# Patient Record
Sex: FEMALE | Race: WHITE | Employment: FULL TIME | URBAN - METROPOLITAN AREA
[De-identification: names, ages, dates, MRNs, and addresses within clinical notes are randomized per-mention and may not be internally consistent; named-entity substitution may affect disease eponyms.]

---

## 2017-05-08 ENCOUNTER — GENERIC CONVERSION - ENCOUNTER (OUTPATIENT)
Dept: OTHER | Facility: OTHER | Age: 32
End: 2017-05-08

## 2017-08-28 ENCOUNTER — GENERIC CONVERSION - ENCOUNTER (OUTPATIENT)
Dept: OTHER | Facility: OTHER | Age: 32
End: 2017-08-28

## 2017-08-28 ENCOUNTER — ALLSCRIPTS OFFICE VISIT (OUTPATIENT)
Dept: OTHER | Facility: OTHER | Age: 32
End: 2017-08-28

## 2017-08-28 DIAGNOSIS — Z34.90 ENCOUNTER FOR SUPERVISION OF NORMAL PREGNANCY: ICD-10-CM

## 2017-09-01 LAB
EXTERNAL ABO GROUPING: NORMAL
EXTERNAL ANTIBODY SCREEN: NORMAL
EXTERNAL HEPATITIS B SURFACE ANTIGEN: NORMAL
EXTERNAL HIV-1 ANTIBODY: NORMAL
EXTERNAL RH FACTOR: POSITIVE
EXTERNAL RUBELLA IGG QUANTITATION: NORMAL
EXTERNAL SYPHILIS RPR SCREEN: NORMAL

## 2017-09-13 ENCOUNTER — GENERIC CONVERSION - ENCOUNTER (OUTPATIENT)
Dept: OTHER | Facility: OTHER | Age: 32
End: 2017-09-13

## 2017-09-21 LAB
EXTERNAL CHLAMYDIA SCREEN: NORMAL
EXTERNAL GONORRHEA SCREEN: NORMAL

## 2017-10-02 ENCOUNTER — GENERIC CONVERSION - ENCOUNTER (OUTPATIENT)
Dept: OTHER | Facility: OTHER | Age: 32
End: 2017-10-02

## 2017-10-02 ENCOUNTER — ALLSCRIPTS OFFICE VISIT (OUTPATIENT)
Dept: PERINATAL CARE | Facility: CLINIC | Age: 32
End: 2017-10-02
Payer: COMMERCIAL

## 2017-10-02 PROCEDURE — 76813 OB US NUCHAL MEAS 1 GEST: CPT | Performed by: OBSTETRICS & GYNECOLOGY

## 2017-10-06 LAB — EXTERNAL AFP: NORMAL

## 2017-10-10 ENCOUNTER — LAB CONVERSION - ENCOUNTER (OUTPATIENT)
Dept: OTHER | Facility: OTHER | Age: 32
End: 2017-10-10

## 2017-10-10 LAB
ABNORMAL MSS? (HISTORICAL): NO
ABNORMAL US? (HISTORICAL): NO
ADVANCED MATERNAL AGE? (HISTORICAL): YES
CHROMOSOME ANALYSIS (HISTORICAL): NORMAL
CHROMOSOME, BLOOD (HISTORICAL): NOT DETECTED
CLINICAL HISTORY (HISTORICAL): NO
COMMENTS: (HISTORICAL): NORMAL
FETAL FRACTION (HISTORICAL): NORMAL
GESTATIONAL AGE (HISTORICAL): 13
GESTATIONAL AGE (HISTORICAL): 5
INTERPRETATION (HISTORICAL): NORMAL
LIMITATIONS (HISTORICAL): NORMAL
METHODOLOGY (HISTORICAL): NORMAL
MICRODELETION (HISTORICAL): NORMAL
MICRODELETION INTERPR. (HISTORICAL): NORMAL
NUMBER OF FETUSES (HISTORICAL): 1
SPECIFICATIONS (HISTORICAL): NORMAL
TRISOMY 13 (T13) (HISTORICAL): NEGATIVE
TRISOMY 18 (T18) (HISTORICAL): NEGATIVE
TRISOMY 21 (T21) (HISTORICAL): NEGATIVE

## 2017-10-11 ENCOUNTER — GENERIC CONVERSION - ENCOUNTER (OUTPATIENT)
Dept: OTHER | Facility: OTHER | Age: 32
End: 2017-10-11

## 2017-10-12 ENCOUNTER — GENERIC CONVERSION - ENCOUNTER (OUTPATIENT)
Dept: OTHER | Facility: OTHER | Age: 32
End: 2017-10-12

## 2017-10-14 ENCOUNTER — GENERIC CONVERSION - ENCOUNTER (OUTPATIENT)
Dept: OBGYN CLINIC | Facility: CLINIC | Age: 32
End: 2017-10-14

## 2017-11-13 ENCOUNTER — GENERIC CONVERSION - ENCOUNTER (OUTPATIENT)
Dept: OTHER | Facility: OTHER | Age: 32
End: 2017-11-13

## 2017-11-28 ENCOUNTER — ALLSCRIPTS OFFICE VISIT (OUTPATIENT)
Dept: PERINATAL CARE | Facility: CLINIC | Age: 32
End: 2017-11-28
Payer: COMMERCIAL

## 2017-11-28 ENCOUNTER — GENERIC CONVERSION - ENCOUNTER (OUTPATIENT)
Dept: OTHER | Facility: OTHER | Age: 32
End: 2017-11-28

## 2017-11-28 PROCEDURE — 76811 OB US DETAILED SNGL FETUS: CPT | Performed by: OBSTETRICS & GYNECOLOGY

## 2017-11-28 PROCEDURE — 76817 TRANSVAGINAL US OBSTETRIC: CPT | Performed by: OBSTETRICS & GYNECOLOGY

## 2017-12-11 ENCOUNTER — GENERIC CONVERSION - ENCOUNTER (OUTPATIENT)
Dept: OTHER | Facility: OTHER | Age: 32
End: 2017-12-11

## 2018-01-09 ENCOUNTER — GENERIC CONVERSION - ENCOUNTER (OUTPATIENT)
Dept: OTHER | Facility: OTHER | Age: 33
End: 2018-01-09

## 2018-01-09 DIAGNOSIS — O09.522 SUPERVISION OF ELDERLY MULTIGRAVIDA IN SECOND TRIMESTER: ICD-10-CM

## 2018-01-09 NOTE — PROGRESS NOTES
SEP 21 2016         RE: Siri Abt                                   To: [facility]   MR#: [id number]                                    [address]   : [facility] [address] [facility]   ENC: [id number]                             Fax: [phone]   (Exam #: [id number])      The LMP of this 33year old,  G2, P1-0-0-1 patient was 2016, giving   her an ADDISON of 2017 and a current gestational age of 11 weeks 3 days   by dates  A sonographic examination was performed on SEP 21 2016 using   real time equipment  The ultrasound examination was performed using   abdominal technique  The patient has a BMI of 37 6  Her blood pressure   today was 133/89  Rossana Gomes had a prenatal office visit earlier today, at which time the fetal   heart rate was not able to be obtained  She presents to the [facility] today to assess fetal viability  Rossana Gomes has no physical complaints  She denies vaginal bleeding or abdominal pain  Multiple longitudinal and transverse sections revealed a ang   intrauterine pregnancy  A slightly indented gestational sac was documented  A normal fetal pole   was visualized  Cardiac motion was not observed  INDICATIONS      viability      Exam Types      Level I      MEASUREMENTS (* Included In Average GA)      CRL              1 7 cm        8 weeks 1 day  *      THE AVERAGE GESTATIONAL AGE is 8 weeks 1 day +/- 5 days  ANATOMY COMMENTS      A single non viable pregnancy is identified  Absence of fetal heart rate   activity is noted on real-time and color Doppler evaluation  The uterine   contour appears normal  There is no suspicion of uterine myoma  Free fluid   is not identified in the posterior cul-de-sac  The ovaries are difficult   to image well  The ultrasound findings are consistent with a missed     ADNEXA      The left ovary was not visualized  The right ovary was not visualized  IMPRESSION      Ramos IUP   8 weeks and 1 day by this ultrasound  (ADDISON=MAY 2 2017)   Heart movement not seen      GENERAL COMMENT      Today's ultrasound findings and suggested follow-up were discussed in   detail with Rossana Gomes  Your office will be in contact with her  to schedule an   appointment in the office to discuss management of the missed ,   including the options of expectant management and a D & E procedure, which   were briefly discussed at the visit today  The face to face time, in addition to time spent discussing ultrasound   results, was 10 minutes, greater than 50% of which was spent during   counseling and coordination of care  ALISA Wise M D     Maternal-Fetal Medicine   Electronically signed 16 17:44

## 2018-01-09 NOTE — PROGRESS NOTES
OCT 2 2017         RE: Nia Marti                                   To: [facility]   MR#: [id number]                                    [address]   : [facility], [address] [place] [address]   ENC: [id number]                             Fax: [phone]   (Exam #: [id number])      The LMP of this 27year old,  G4, P1-0-2-1 patient was 2017, giving   her an ADDISON of 2018 and a current gestational age of 17 weeks 1 day   by dates  A sonographic examination was performed on OCT 2 2017 using real   time equipment  The ultrasound examination was performed using abdominal   technique  The patient has a BMI of 40 3  Her blood pressure today was   117/78  Earliest US on record: 17  7w6d  4-10-18   ADDISON      Multiple longitudinal and transverse sections revealed a ang   intrauterine pregnancy with the fetus in vertex presentation  The placenta   is anterior in implantation, grade I in appearance  Cardiac motion was observed at 159 bpm       INDICATIONS      first trimester genetic screening   morbid obesity   advanced maternal age      Exam Types      Level I      RESULTS      Fetus # 1 of 1   Vertex presentation   Fetal growth appeared normal      MEASUREMENTS (* Included In Average GA)      CRL              6 8 cm        12 weeks 6 days*   Nuchal Trans    1 60 mm      THE AVERAGE GESTATIONAL AGE is 12 weeks 6 days +/- 7 days  ANATOMY COMMENTS      Anatomic detail is limited at this gestational age  The yolk sac was not   noted  The fetal cranium appeared normal in shape and the nuchal   translucency was normal in size (1 6mm)  The nasal bone appears to be   present  The intracranial anatomy was unremarkable  Evaluation of the   spine revealed no obvious evidence for a neural tube defect  Anatomy of   the fetal thorax appeared within normal limits  The cardiac rhythm was   regular    Within the abdomen, stomach & bladder were visualized and the   abdominal wall appeared intact  A three vessel cord appears to be present  Active movement of the fetal body & extremities was seen  There is no   suspicion of a subchorionic bleed  The placental cord insertion was   normal    There is no suspicion of a uterine myoma  Free fluid is not seen   in the posterior cul-de-sac  ADNEXA      The left ovary was not visualized  The right ovary was not visualized  AMNIOTIC FLUID         Largest Vertical Pocket = 3 3 cm   Amniotic Fluid: Normal      IMPRESSION      Ramos IUP   12 weeks and 6 days by this ultrasound  (ADDISON=APR 10 2018)   Vertex presentation   Fetal growth appeared normal   Regular fetal heart rate of 159 bpm   Anterior placenta      CONSULT COMMENT      Thank you very much for requesting a consultation on this very nice   patient for the indication of genetic screening  This is the patient's   fourth pregnancy  She has a history of a previous full-term vaginal   delivery in 2011  She had 2 first trimester miscarriages, her first in   2010 and her second in 2016  She has a history of asthma and morbid   obesity  She denies the current use of tobacco, alcohol, or drugs  She   currently takes prenatal vitamins and utilizes her albuterol inhaler and   Advair as needed  She does not utilize her Advair as a maintenance   medication but only when she developeds a respiratory infection  We discussed the options for genetic screening, including but not limited   to first trimester screening, second trimester screening, combined first   and second trimester screening, noninvasive prenatal testing (NIPT) for   patients at high risk and diagnostic screening through the use of CVS and   amniocentesis    We discussed the risks and benefits of each approach   including the sensitivities and false positive rates as well as the   difference between a screening test and a diagnostic test   At the   conclusion of our discussion the patient elected noninvasive prenatal   testing utilizing the QNatal Advanced cell-free fetal DNA test through   [facility]  The patient was given a requisition to have this blood   work drawn through [facility] and the results should be available   in approximately 7-10 days  The implications of obesity and pregnancy are significant  The level of   obesity is directly related to the risk of adverse pregnancy outcomes   including but not limited to, risk of diabetes, hypertensive disorders of   pregnancy, macrosomia, intrauterine growth restriction, labor and shoulder   dystocias,  section, and increased risk of stillbirth  Recommend   discussing the current weight gain recommendations for women with obesity   and discussing good dietary practices as well as the safety of exercise in   pregnancy  I recommend the patient gain no more than 10-15 pounds   throughout her entire pregnancy, increase her exercise and follow healthy   dietary habits  Consider referral to a dietitian should the patient have   difficulty following the aforementioned recommendations  Recommend third   trimester growth ultrasounds to screen for fetal growth problems as well   as ensuring the patient is appropriately screened for pregestational and   gestational diabetes  We discussed follow-up in detail and I recommend an anatomy ultrasound be   scheduled for 20 weeks gestation  Thank you very much for allowing us to participate in the care of this   very nice patient  Should you have any questions, please do not hesitate   to contact our office  Please note, in addition to the time spent discussing the results of the   ultrasound, I spent approximately 15 minutes of face-to-face time with the   patient, greater than 50% of which was spent in counseling and the   coordination of care for this patient  Portions of the record may have been created with voice recognition   software    Occasional wrong word or "sound a like" substitutions may have   occurred due to the inherent limitations of voice recognition software  Read the chart carefully and recognize, using context, where substitutions   have occurred  ALISA Hendrix M D     Electronically signed 10/05/17 21:51

## 2018-01-10 NOTE — MISCELLANEOUS
Attached is your new obstetrical paperwork package for your upcoming appointment  Please fill out all highlighted sections of Parts 2, 3 and 4 and complete  the questionnaire and consent forms in their entirety  Please contact your insurance company to find out which lab you must use for bloodwork  Also, please bring all completed paperwork with you to your first appointment  Prior to your first  appointment, please fax or email a copy of your current insurance card (both sides) in order for our office to pre-certify your insurance  Our fax number is [phone], shahnaz Gonzalez or email to  [email or web address]  Arrive at least 20 minutes prior to your appointment time

## 2018-01-11 NOTE — MISCELLANEOUS
Message  patient called and requested to know gender and is aware female  Active Problems    1  , spontaneous threatened (640 00) (O20 0)   2  Asthma (493 90) (J45 909)   3  Encounter for pregnancy related examination (V22 1) (Z34 90)   4  Encounter for pregnancy related examination in first trimester (V22 1) (Z34 91)   5  High risk sexual behavior (V69 2) (Z72 51)   6  Knee pain, left (719 46) (M25 562)   7  Missed  (69 849 69 22) (O02 1)   8  Spontaneous  (634 90) (O03 9)   9  Threatened  (640 00) (O20 0)    Current Meds   1  Advair Diskus 100-50 MCG/DOSE Inhalation Aerosol Powder Breath Activated; INHALE   ONE PUFF BY MOUTH TWICE A DAY; Therapy: 61AGW6556 to ([facility])  Requested for: 32YDO7902; Last   WF:33FIH2221 Ordered   2  Advil CAPS; Therapy: (Recorded:2017) to Recorded   3  Montelukast Sodium 10 MG Oral Tablet (Singulair); take one tablet by mouth every day; Therapy: 70WYK5938 to ([facility] )  Requested for: 55GKO7680; Last   JH:33AMY2372 Ordered   4  ProAir  (90 Base) MCG/ACT Inhalation Aerosol Solution; INHALE 1 TO 2 PUFFS   BY MOUTH EVERY 4 TO 6 HOURS AS NEEDED; Therapy: 39Jka0629 to (Evaluate:41Kqs0949)  Requested for: 64ZFF8679; Last   KH:43EKI7569 Ordered   5  Vitafol-OB+DHA 65-1 & 250 MG Oral Miscellaneous; TAKE 1 MG Daily; Therapy: 82ZMO9104 to (Last Rx:68Cxk3102)  Requested for: 25Rfn4527 Ordered    Allergies    1  No Known Drug Allergies    2  Animal dander   3  No Known Latex Allergies   4   Seasonal    Signatures   Electronically signed by : Elizabeth Neely, ; Oct 12 2017  9:49AM EST                       (Author)

## 2018-01-11 NOTE — MISCELLANEOUS
Message  Lm on pt voice mail with Q-Jordan results-wnl for trisomies 13,18,21  AFP testing instruction also given to pt and pt to contact [facility] if questions-trf mailed  Active Problems    1  , spontaneous threatened (640 00) (O20 0)   2  Asthma (493 90) (J45 909)   3  Encounter for pregnancy related examination (V22 1) (Z34 90)   4  Encounter for pregnancy related examination in first trimester (V22 1) (Z34 91)   5  High risk sexual behavior (V69 2) (Z72 51)   6  Knee pain, left (719 46) (M25 562)   7  Missed  (69 849 69 22) (O02 1)   8  Spontaneous  (634 90) (O03 9)   9  Threatened  (640 00) (O20 0)    Current Meds   1  Advair Diskus 100-50 MCG/DOSE Inhalation Aerosol Powder Breath Activated; INHALE   ONE PUFF BY MOUTH TWICE A DAY; Therapy: 95IKB4922 to (Caitlyn Gonzales)  Requested for: 94OGW3145; Last   FH:92OTD2776 Ordered   2  Advil CAPS; Therapy: (Recorded:2017) to Recorded   3  Montelukast Sodium 10 MG Oral Tablet (Singulair); take one tablet by mouth every day; Therapy: 91SJP0519 to (Jyoti Carnes)  Requested for: 41JEX8533; Last   XO:82UUW0229 Ordered   4  ProAir  (90 Base) MCG/ACT Inhalation Aerosol Solution; INHALE 1 TO 2 PUFFS   BY MOUTH EVERY 4 TO 6 HOURS AS NEEDED; Therapy: 67Prq5746 to (Evaluate:39Lht9240)  Requested for: 56KHP4533; Last   DP:03TEK4820 Ordered   5  Vitafol-OB+DHA 65-1 & 250 MG Oral Miscellaneous; TAKE 1 MG Daily; Therapy: 14VJG2778 to (Last Rx:96Hqb3773)  Requested for: 44Ecw9582 Ordered    Allergies    1  No Known Drug Allergies    2  Animal dander   3  No Known Latex Allergies   4   Seasonal    Signatures   Electronically signed by : Ralph Bell RN; Oct 11 2017  2:38PM EST                       (Author)

## 2018-01-11 NOTE — CONSULTS
I had the pleasure of evaluating your patient, Marbella Stallings  My full evaluation follows:      Chief Complaint  Here for ultrasound study      History of Present Illness  Please refer to the ultrasound report for additional information  Active Problems    1  Asthma (493 90) (J45 909)   2  Encounter for supervision of normal pregnancy in multigravida in second trimester   (V22 1) (Z34 82)   3  History of High risk sexual behavior (V69 2) (Z72 51)    Past Medical History    · History of Asthma with exacerbation (493 92) (J45 901)   · History of Encounter for gynecological examination with Papanicolaou smear of cervix  (V72 31) (Z01 419)   · History of Encounter for pregnancy related examination (V22 1) (Z34 90)   · History of Encounter for pregnancy related examination in first trimester (V22 1) (Z34 91)   · History of nausea (V12 79) (K77 867)   · History of pregnancy (V13 29)   · History of seasonal allergies (V15 09) (Z88 9)   · History of spontaneous  (V13 29) (Z87 59)   · History of threatened  (V13 29) (Z87 59)   · History of threatened  (V13 29) (Z87 59)   · History of vomiting (V13 89) (Z87 898)   · History of Knee pain, left (719 46) (M25 562)   · History of Missed  (632) (O02 1)    Surgical History    · Denied: History Of Prior Surgery    Family History    · No pertinent family history    · Family history of diabetes mellitus (V18 0) (Z83 3)   · Family history of hypothyroidism (V18 19) (Z83 49)    · Family history of asthma (V17 5) (Z82 5)    Social History    · Denied: History of H/O domestic violence   · History of High risk sexual behavior (V69 2) (Z72 51)   · Never a smoker   · No alcohol use   · No drug use    Current Meds   1  Advair Diskus 100-50 MCG/DOSE Inhalation Aerosol Powder Breath Activated; INHALE   ONE PUFF BY MOUTH TWICE A DAY; Therapy: 87KQU7248 to (Karissa Canseco)  Requested for: 48QRH9349; Last   ES:53KWW8431 Ordered   2   Montelukast Sodium 10 MG Oral Tablet; take one tablet by mouth every day; Therapy: 88DWF8972 to (Lacinda Shows)  Requested for: 21THV5126; Last   GW:20HWL2456 Ordered   3  ProAir  (90 Base) MCG/ACT Inhalation Aerosol Solution; INHALE 1 TO 2 PUFFS   BY MOUTH EVERY 4 TO 6 HOURS AS NEEDED; Therapy: 76Dtm6199 to (Evaluate:05Akk0451)  Requested for: 93PEE3805; Last   UT:70SPX2651 Ordered   4  Vitafol-OB+DHA 65-1 & 250 MG Oral Miscellaneous; Take one by mouth every day; Therapy: 68GMK6446 to (Evaluate:12Ejk8492)  Requested for: 95WIH1919; Last   Rx:17Skj4223 Ordered    Allergies    1  No Known Drug Allergies    2  Animal dander   3  No Known Latex Allergies   4  Seasonal    Vitals   Recorded: 24ZZJ6520 68:87AD   Systolic 072, LUE, Sitting   Diastolic 70, LUE, Sitting   BP CUFF SIZE Large   Height 5 ft 4 in   Weight 241 lb    BMI Calculated 41 37   BSA Calculated 2 12   Pain Scale 0     Results/Data  Exam description: level II obstetrical ultrasound, transvaginal obstetrical ultrasound  Findings: Please refer to the ultrasound report for additional information  Discussion/Summary    Please refer to the ultrasound report for additional information  The patient was counseled regarding diagnostic results, instructions for management, prognosis, impressions  Thank you very much for allowing me to participate in the care of this patient  If you have any questions, please do not hesitate to contact me        Future Appointments    Signatures   Electronically signed by : BARRON Paniagua ; Nov 30 2017  7:51AM EST                       (Author)

## 2018-01-12 NOTE — RESULT NOTES
Verified Results  (Q) QNATAL (TM) ADVANCED 46OTB0597 07:35AM Mimi Andrade     Test Name Result Flag Reference   NUMBER OF FETUSES 1     ADVANCED MATERNAL AGE? YES     ABNORMAL ADDISON? NO     ABNORMAL US? NO     PERSONAL/FAM HISTORY? NO     INTERPRETATION SEE NOTE     This specimen showed expected representation of chromosome  21, 18, and 13 material  Microdeletion testing was not  performed per clinician request    TRISOMY 21 (T21) Negative     TRISOMY 18 (T18) Negative     TRISOMY 13 (T13) Negative     Y CHROMOSOME Not detected     Y CHR  INTERPRETATION SEE NOTE     Consistent with a female fetus  SEX CHROMOSOME No aneuploidy     SEX CHROMOSOME INTERP SEE NOTE     No apparent abnormality was detected  See 'Limitations'  below  MICRODELETION Opted Out     MICRODELETION INTERP SEE NOTE     Microdeletion testing was not performed per clinician  request    GESTATION AGE (IN WEEKS) 13     GESTATIONAL AGE (IN DAYS) 5     FETAL FRACTION 6 08%     LABORATORY COMMENTS SEE NOTE     Laboratory results and submitted clinical information  reviewed by Nadir Long, Ph D , Nora Tariq  LIMITATIONS SEE NOTE     This test has been validated on women with a ang  pregnancy that is >=10 weeks gestational age  As such, the  accuracy of this test for specimens drawn at less than 10  weeks gestation is unknown  In addition, there are limited  data available for the performance of this test in multiple  gestation pregnancies and for the detection of  microdeletions  Specimens are analyzed for aneuploidies  involving chromosomes 21, 18, 13, X, and Y, and  microdeletions of the specified regions only  The Y  chromosome is analyzed for the determination of fetal sex,  and the sensitivity and specificity of this analysis may be  less than that of the autosome analysis  Sex chromosome  aneuploidy analysis is not performed for multiple gestation  pregnancies   Aneuploidies involving chromosomes other than  those specified above or abnormalities involving chromosomal  regions other than those specified are not included in this  testing  While the results of this test are highly accurate,  not all of the chromosome abnormalities interrogated may be  detected due to maternal, placental, or fetal mosaicism, or  other unexplained causes  The accuracy of the test results  may also be affected by the presence of chromosome  abnormalities or copy number variations that are maternal in  origin, or by vanishing twin syndrome in a multiple  gestation pregnancy  Circulating cell-free fetal DNA  screening does not replace the precision of diagnosis using  chorionic villus sampling or amniocentesis  It does not  assess the risk of fetal anomalies such as neural tube  defects or ventral wall defects, and should not be  considered in isolation from other clinical findings and  laboratory test results  Management decisions, including  pregnancy termination, should not be based solely on the  results of cell-free DNA screening  A negative test result  does not ensure an unaffected pregnancy  The healthcare  provider is responsible for the use of this information in  the management of his/her patient  Health care providers, please contact your local [address]   genetic counselor or call [employer] [facility] at [facility] ([phone]) for assistance with  interpretation of these results  SPECIFICATIONS SEE NOTE     Sensitivity         Specificity  T21        >99 9%               >99 9%     T18        >99 9%               >99 9%     T13        >99 9%               >99 9%               Accuracy  Y          >99 9%     Performance of the [employer] laboratory-developed test  (LDT) has been determined based on internal analytical  assessment  METHODOLOGY SEE NOTE     Circulating cell-free (cf) DNA was isolated from plasma  It  was then detected on a massively parallel sequencing  platform   Bioinformatic analysis was performed to determine  the representation of fetal DNA in the specimen, especially  fetal material from chromosomes 21, 18, and 13  The  representation of other fetal material, including the sex  chromosomes (X and Y) and select chromosomal regions (22q,  15q, 11q, 8q, 5p, 4p 1p), was also evaluated and will only  be reported as 'Additional Chromosome Results' when an  abnormality is detected  This test was developed and its  performance characteristics have been determined by [facility], [facility] [address]  It has  not been cleared or approved by the [place]  Food and Drug  Administration  The FDA has determined that such clearance  or approval is not necessary  Performance characteristics  refer to the analytical performance of the test  This test  is performed pursuant to a license agreement with [employer]  This test was developed and its analytical performance  characteristics have been determined by [facility]  It has not been  cleared or approved by FDA  This assay has been validated  pursuant to the CLIA regulations and is used for clinical  purposes

## 2018-01-13 VITALS
DIASTOLIC BLOOD PRESSURE: 78 MMHG | BODY MASS INDEX: 40.12 KG/M2 | SYSTOLIC BLOOD PRESSURE: 117 MMHG | HEIGHT: 64 IN | WEIGHT: 235 LBS

## 2018-01-14 VITALS
WEIGHT: 241 LBS | SYSTOLIC BLOOD PRESSURE: 132 MMHG | HEIGHT: 64 IN | BODY MASS INDEX: 41.15 KG/M2 | DIASTOLIC BLOOD PRESSURE: 70 MMHG

## 2018-01-14 NOTE — PROGRESS NOTES
2017         RE: Leelee Read                                   To: [facility]   MR#: [id number]                                    [address]   : [facility], [address] [place] [address]   ENC: [id number]                             Fax: [phone]   (Exam #: [id number])      The LMP of this 27year old,  G4, P1-0-2-1 patient was 2017, giving   her an ADDISON of 2018 and a current gestational age of 22 weeks 2 days   by dates  A sonographic examination was performed on 2017 using   real time equipment  The ultrasound examination was performed using   abdominal & vaginal techniques  The patient has a BMI of 41 4  Her blood   pressure today was 132/70  Earliest US on record: 17  7w6d  4-10-18   ADDISON            Yesenia Berumen has no complaints today  She reports fetal movement and denies   vaginal bleeding  Cell free DNA analysis earlier in the pregnancy revealed   negative results  MSAFP testing was drawn last week with a result   currently pending  Yesenia Berumen has not yet received the influenza vaccine during   this pregnancy  She has not yet been screened for gestational diabetes        Cardiac motion was observed at 150 bpm       INDICATIONS      morbid obesity   advanced maternal age      Exam Types      LEVEL II   Transvaginal      RESULTS      Fetus # 1 of 1   Variable presentation   Fetal growth appeared normal   Placenta Location = Anterior   No placenta previa   Placenta Grade = II      MEASUREMENTS (* Included In Average GA)      AC              16 4 cm        21 weeks 1 day * (47%)   BPD              4 8 cm        20 weeks 4 days* (27%)   HC              18 1 cm        20 weeks 3 days* (25%)   Femur            3 4 cm        20 weeks 6 days* (35%)      Nuchal Fold      3 9 mm   NBL              5 5 mm      Humerus          3 4 cm        21 weeks 3 days  (51%)      Cerebellum       2 0 cm        20 weeks 1 day   Biorbit          3 1 cm 19 weeks 6 days   CisternaMagna    2 9 mm      HC/AC           1 11   FL/AC           0 21   FL/BPD          0 71   EFW (Ac/Fl/Hc)   391 grams - 0 lbs 14 oz      THE AVERAGE GESTATIONAL AGE is 20 weeks 5 days +/- 10 days  AMNIOTIC FLUID         Largest Vertical Pocket = 5 8 cm   Amniotic Fluid: Normal      CERVICAL EVALUATION      The cervix appeared normal (Ultrasound Examination)  SUPINE      Cervical Length: 4 00 cm      OTHER TEST RESULTS           Funneling?: No             Dynamic Changes?: No        Resp  To TFP?: No                      Debris?: No      ANATOMY      Head                                    Normal   Face/Neck                               Normal   Th  Cav  Normal   Heart                                   See Details   Abd  Cav  Normal   Stomach                                 Normal   Right Kidney                            Normal   Left Kidney                             Normal   Bladder                                 Normal   Abd  Wall                               Normal   Spine                                   Normal   Extrems                                 Normal   Genitalia                               Normal   Placenta                                Normal   Umbl  Cord                              Normal   Uterus                                  Normal   PCI                                     Normal      ANATOMY DETAILS      Visualized Appearing Sonographically Normal:   HEAD: (Calvarium, BPD Level, Cavum, Lateral Ventricles, Choroid Plexus,   Cerebellum, Cisterna Magna);    FACE/NECK: (Neck, Nuchal Fold, Profile,   Orbits, Nose/Lips, Palate, Face);    TH  CAV  : (Lungs, Diaphragm);       HEART: (Four Chamber View, Proximal Left Outflow, Proximal Right Outflow,   3VV, Short [place] of Greater Vessels, Ductal Arch, IVC, SVC, Cardiac Axis,   Cardiac Position);    ABD  CAV : (Liver, Gall Bladder);    STOMACH, RIGHT   KIDNEY, LEFT KIDNEY, BLADDER, ABD  WALL, SPINE: (Cervical Spine, Thoracic   Spine, Lumbar Spine, Sacrum);    EXTREMS: (Lt Humerus, Rt Humerus, Lt   Forearm, Rt Forearm, Lt Hand, Rt Hand, Lt Femur, Rt Femur, Lt Low Leg, Rt   Low Leg, Lt Foot, Rt Foot);    GENITALIA (Female), PLACENTA, UMBL  CORD,   UTERUS, PCI      Not Visualized:   HEART: (3 Vessel Trachea, Aortic Arch, Interventricular Septum,   Interatrial Septum)      ADNEXA      The left ovary appeared normal and measured 2 1 x 1 5 x 1 0 cm with a   volume of 1 6 cc  The right ovary appeared normal and measured 3 0 x 2 6 x   1 5 cm with a volume of 6 1 cc  IMPRESSION      Ramos IUP   20 weeks and 5 days by this ultrasound  (ADDISON=APR 12 2018)   Variable presentation   Fetal growth appeared normal   Regular fetal heart rate of 150 bpm   Anterior placenta   No placenta previa      GENERAL COMMENT      No fetal structural abnormality or ultrasound marker for aneuploidy is   identified on the Level II ultrasound study today  Suboptimal imaging of   the cardiac aortic arch, septal, and 3 vessel tracheal views is afforded   by the constraints related to maternal morbid obesity and unfavorable   fetal position  Fetal growth and amniotic fluid volume are normal   The   placenta is normal in appearance  The cervix is normal in appearance by transvaginal sonography  The   cervical length is normal   Cervical debris is not present  Cervical   funneling is not present  Neither provocative nor dynamic change is   appreciated  Today's ultrasound findings and suggested follow-up were discussed in   detail with Stefania Lloyd  We discussed that prenatal ultrasound cannot rule out   all congenital abnormalities  Her non invasive prenatal testing results   were discussed in detail   Stefania Lloyd will return to the [facility]  in the   early third trimester to assess fetal interval growth for the indications   of morbid obesity and advanced maternal age and evaluate anatomic targets   not imaged well today  Weekly nonstress testing is recommended for   additional pregnancy surveillance for the indication of morbid obesity   beginning at 36 weeks gestation, sooner if otherwise clinically indicated  We discussed the importance of receiving the influenza vaccine during   pregnancy which was offered to Annabel Mora, but which she declined  Screening for   gestational diabetes should be considered soon  The face to face time, in addition to time spent discussing ultrasound   results, was approximately 10 minutes, greater than 50% of which was spent   during counseling and coordination of care  ALISA Nugent M D     Maternal-Fetal Medicine   Electronically signed 11/30/17 07:50

## 2018-01-22 VITALS
DIASTOLIC BLOOD PRESSURE: 72 MMHG | SYSTOLIC BLOOD PRESSURE: 118 MMHG | BODY MASS INDEX: 40.12 KG/M2 | WEIGHT: 235 LBS | HEIGHT: 64 IN

## 2018-01-22 VITALS
HEIGHT: 64 IN | SYSTOLIC BLOOD PRESSURE: 128 MMHG | BODY MASS INDEX: 40.29 KG/M2 | WEIGHT: 236 LBS | DIASTOLIC BLOOD PRESSURE: 84 MMHG

## 2018-01-22 VITALS — WEIGHT: 239 LBS | SYSTOLIC BLOOD PRESSURE: 132 MMHG | BODY MASS INDEX: 41.02 KG/M2 | DIASTOLIC BLOOD PRESSURE: 82 MMHG

## 2018-01-22 VITALS
BODY MASS INDEX: 41.15 KG/M2 | SYSTOLIC BLOOD PRESSURE: 128 MMHG | DIASTOLIC BLOOD PRESSURE: 68 MMHG | HEIGHT: 64 IN | WEIGHT: 241 LBS

## 2018-01-23 ENCOUNTER — OB ABSTRACT (OUTPATIENT)
Dept: OBGYN CLINIC | Facility: CLINIC | Age: 33
End: 2018-01-23

## 2018-01-23 LAB
BACTERIA UR CULT: NORMAL COL/ML
HEPATITIS C ANTIBODY (HISTORICAL): NON REACTIVE
HPV RRNA GENITAL QL NAA+PROBE: NEGATIVE
PAP (HISTORICAL): NORMAL
T4 FREE SERPL-MCNC: 1.18 NG/DL (ref 0.76–1.46)
TSH SERPL DL<=0.05 MIU/L-ACNC: 1.35 UIU/ML (ref 0.36–3.74)

## 2018-01-24 VITALS — WEIGHT: 252 LBS | SYSTOLIC BLOOD PRESSURE: 122 MMHG | DIASTOLIC BLOOD PRESSURE: 84 MMHG | BODY MASS INDEX: 43.26 KG/M2

## 2018-01-24 VITALS — BODY MASS INDEX: 42.05 KG/M2 | WEIGHT: 245 LBS | DIASTOLIC BLOOD PRESSURE: 84 MMHG | SYSTOLIC BLOOD PRESSURE: 130 MMHG

## 2018-01-31 ENCOUNTER — ROUTINE PRENATAL (OUTPATIENT)
Dept: OBGYN CLINIC | Facility: CLINIC | Age: 33
End: 2018-01-31

## 2018-01-31 VITALS — DIASTOLIC BLOOD PRESSURE: 80 MMHG | SYSTOLIC BLOOD PRESSURE: 124 MMHG | BODY MASS INDEX: 43.6 KG/M2 | WEIGHT: 254 LBS

## 2018-01-31 DIAGNOSIS — Z34.83 PRENATAL CARE, SUBSEQUENT PREGNANCY, THIRD TRIMESTER: Primary | ICD-10-CM

## 2018-01-31 PROBLEM — O09.522 ELDERLY MULTIGRAVIDA IN SECOND TRIMESTER: Status: ACTIVE | Noted: 2017-11-28

## 2018-01-31 PROCEDURE — PNV: Performed by: PHYSICIAN ASSISTANT

## 2018-01-31 RX ORDER — ALBUTEROL SULFATE 2.5 MG/3ML
2.5 SOLUTION RESPIRATORY (INHALATION) EVERY 4 HOURS PRN
COMMUNITY
End: 2019-08-28 | Stop reason: SDUPTHER

## 2018-01-31 RX ORDER — MONTELUKAST SODIUM 10 MG/1
1 TABLET ORAL DAILY PRN
COMMUNITY
Start: 2017-05-08 | End: 2019-08-28 | Stop reason: SDUPTHER

## 2018-01-31 RX ORDER — VITAMIN A, ASCORBIC ACID, VITAMIN D, .ALPHA.-TOCOPHEROL, THIAMINE MONONITRATE, RIBOFLAVIN, NIACIN, PYRIDOXINE HYDROCHLORIDE, FOLIC ACID, CYANOCOBALAMIN, CALCIUM, IRON, MAGNESIUM, ZINC, COPPER, AND DOCONEXENT 65-1-250MG
1 KIT ORAL DAILY
COMMUNITY
Start: 2018-01-17 | End: 2018-04-21 | Stop reason: SDUPTHER

## 2018-01-31 RX ORDER — ALBUTEROL SULFATE 90 UG/1
1-2 AEROSOL, METERED RESPIRATORY (INHALATION) EVERY 4 HOURS PRN
COMMUNITY
Start: 2015-04-13 | End: 2019-08-28 | Stop reason: SDUPTHER

## 2018-01-31 NOTE — PROGRESS NOTES
Visit: Good Fm, occ nausea, no vomiting  No HA, cramping, VB, LOF, edema, domestic violence, or smoking  Tolerating PNV  Patient's 1 hr GTT was 166, script for 3hr GTT given  Has growth ultrasound scheduled  Recommend Tdap  Patient declined flu shot  30 wk packet given  Breastfeeding booklet given, patient is planning on breastfeeding   previous children  Continue routine prenatal care  Return to office in 2 weeks for ob check

## 2018-02-04 LAB
GLUCOSE 1H P 100 G GLC PO SERPL-MCNC: 168 MG/DL (ref 65–179)
GLUCOSE 2H P 100 G GLC PO SERPL-MCNC: 113 MG/DL (ref 65–154)
GLUCOSE 3H P 100 G GLC PO SERPL-MCNC: 118 MG/DL (ref 65–139)
GLUCOSE P FAST SERPL-MCNC: 81 MG/DL (ref 65–94)
SL AMB NOTE:: (no result)

## 2018-02-12 ENCOUNTER — ULTRASOUND (OUTPATIENT)
Dept: PERINATAL CARE | Facility: CLINIC | Age: 33
End: 2018-02-12
Payer: COMMERCIAL

## 2018-02-12 VITALS
SYSTOLIC BLOOD PRESSURE: 139 MMHG | BODY MASS INDEX: 43.57 KG/M2 | DIASTOLIC BLOOD PRESSURE: 87 MMHG | HEART RATE: 112 BPM | WEIGHT: 255.2 LBS | HEIGHT: 64 IN

## 2018-02-12 DIAGNOSIS — Z36.88 ENCOUNTER FOR ANTENATAL SCREENING FOR FETAL MACROSOMIA: Primary | ICD-10-CM

## 2018-02-12 DIAGNOSIS — IMO0002 EVALUATE ANATOMY NOT SEEN ON PRIOR SONOGRAM: ICD-10-CM

## 2018-02-12 DIAGNOSIS — O99.213 OBESITY AFFECTING PREGNANCY IN THIRD TRIMESTER: ICD-10-CM

## 2018-02-12 PROCEDURE — 99212 OFFICE O/P EST SF 10 MIN: CPT | Performed by: OBSTETRICS & GYNECOLOGY

## 2018-02-12 PROCEDURE — 76816 OB US FOLLOW-UP PER FETUS: CPT | Performed by: OBSTETRICS & GYNECOLOGY

## 2018-02-12 NOTE — PROGRESS NOTES
Maternal-Fetal Medicine: Ms Bria Millard was seen today in the [address] [facility] today for fetal growth assessment ultrasound  Please see ultrasound report under "OB Procedures" tab in EPIC    Thank you for the referral and please don't hesitate to contact our office with any concerns or questions      Sincerely,  Sophia Su MD  Attending Physician, Maternal-Fetal Medicine

## 2018-02-14 ENCOUNTER — HOSPITAL ENCOUNTER (OUTPATIENT)
Facility: HOSPITAL | Age: 33
Discharge: HOME | End: 2018-02-14
Attending: OBSTETRICS & GYNECOLOGY | Admitting: OBSTETRICS & GYNECOLOGY
Payer: OTHER MISCELLANEOUS

## 2018-02-14 ENCOUNTER — TELEPHONE (OUTPATIENT)
Dept: OBGYN CLINIC | Facility: CLINIC | Age: 33
End: 2018-02-14

## 2018-02-14 VITALS
SYSTOLIC BLOOD PRESSURE: 131 MMHG | TEMPERATURE: 98.6 F | DIASTOLIC BLOOD PRESSURE: 71 MMHG | WEIGHT: 255 LBS | HEIGHT: 64 IN | HEART RATE: 110 BPM | BODY MASS INDEX: 43.54 KG/M2

## 2018-02-14 DIAGNOSIS — Z3A.32 32 WEEKS GESTATION OF PREGNANCY: ICD-10-CM

## 2018-02-14 PROCEDURE — G0463 HOSPITAL OUTPT CLINIC VISIT: HCPCS

## 2018-02-14 PROCEDURE — 99213 OFFICE O/P EST LOW 20 MIN: CPT

## 2018-02-14 NOTE — TELEPHONE ENCOUNTER
Spoke with Ari Knig, she is going to report to L&D for checkout  Dr Brittany Doss and L&D are aware

## 2018-02-14 NOTE — PROGRESS NOTES
Triage Note - OB  Jeffrey Miller 27 y o  female MRN: [id number]  Unit/Bed#: LD Triage 2-01 Encounter: [id number]    OB TRIAGE NOTE  Jeffrey Miller  [id number]  2/14/2018  5:34 PM  LD Triage 2/LD Triage 2-*    ASSESS:  27 y o  Deirdre Ramirez who presents for extended fetal monitoring s/p fall  PLAN  #1  Extended fetal monitoring:    Fetal monitoring started at [phone] [id number] 90, ended at 80  Strip remained reactive  No contractions noted on tocometry  #2  Discharge instructions:    Patient instructed to follow up with OBGYN for routine prenatal care  D/w Dr Katherine Mcdonough  ______________    SUBJECTIVE    ADDISON: Estimated Date of Delivery: 4/8/18    HPI Chronology:  27 y o  Deirdre Ramirez presents for extended fetal monitoring after a fall this morning  Patient was going to work when she slipped on ice right outside her work  Patient reports that she fell on her hands and knees and did not strike her abdomen  She did not sustain any injuries from her fall  Patient denies contractions, leakage of fluid, vaginal bleeding, decreased fetal movement  Patient feels well  Vitals:   /71   Pulse (!) 110   Temp 98 6 °F (37 °C) (Oral)   Ht 5' 4" (1 626 m)   Wt 116 kg (255 lb)   LMP 07/02/2017 (Exact Date)   BMI 43 77 kg/m²   Body mass index is 43 77 kg/m²  Review of Systems   Constitutional: Negative  Respiratory: Negative  Cardiovascular: Negative  Gastrointestinal: Negative  Genitourinary: Negative  Physical Exam   Constitutional: She is oriented to person, place, and time  She appears well-developed and well-nourished  Cardiovascular: Normal rate, regular rhythm and normal heart sounds  Exam reveals no gallop and no friction rub  No murmur heard  Pulmonary/Chest: Effort normal and breath sounds normal  No respiratory distress  She has no wheezes  She has no rales  Abdominal: Soft  Bowel sounds are normal  She exhibits no distension  There is no tenderness  There is no rebound  Neurological: She is alert and oriented to person, place, and time  FHT:  Baseline Rate: 115 bpm  Variability: Moderate 6-25 bpm  Accelerations: 15 x 15 or greater, With fetal movment  Decelerations: None  FHR Category: Category I  TOCO:   Contraction Frequency (minutes): none    Labs: No results found for this or any previous visit (from the past 24 hour(s))  Lab, Imaging and other studies: I have personally reviewed pertinent reports          Rodrigo Can MD  2/14/2018  5:34 PM

## 2018-02-14 NOTE — DISCHARGE INSTRUCTIONS
Early Labor Signs   WHAT YOU SHOULD KNOW:   Early labor signs are changes in your body that allow your baby to pass through your birth canal   AFTER YOU LEAVE:   Signs and symptoms of early labor:   · Lightening  occurs when your baby drops inside your pelvis  You may feel increased pressure in your pelvis  This may happen a few weeks to a few hours before your labor begins  · Contractions  are cramps and tightening that occur in your uterus to help move the baby through your birth canal  Contractions occur regularly and more often each time  Each one lasts about 30 to 70 seconds, and gets stronger and more painful until you deliver your baby  Contractions do not go away with movement  They start in your lower back and move to the front in your abdomen  · Effacement  occurs when your cervix softens and thins, so it can easily open for the baby  Your primary healthcare provider [facility] or obstetrician will examine your cervix for effacement  · Dilation  is widening of your cervix, also for the baby's passage  Your PHP or obstetrician will examine your cervix for dilation  Your cervix will be fully opened and ready for delivery when it is dilated to 10 centimeters  · Increased discharge  from your vagina may occur  It may be pink, clear, or slightly bloody  This discharge may also be called bloody show  Bloody show is a mucus plug that forms and blocks your cervix during pregnancy  · Rupture of membranes  is a sudden release of clear fluid from your vagina  It is also known as when your water breaks  Your PHP or obstetrician may need to break your water if it does not break on its own  False labor: You may have false labor signs, which are also called Pravin Howard contractions  False labor is common and may happen several weeks or days before your actual labor  The contractions are not regular, and do not get closer together  The pain is usually mild, does not worsen, and is felt only in front  Pravin Howard contractions may happen later in the day, and stop after you change position, walk, or rest   Contact your PHP or obstetrician if:   · You have pain in your lower back or abdomen  · You have bloody mucus or show  · You have questions or concerns about your condition or care  Seek care immediately or call 911 if:   · You have regular, painful contractions that are less than 5 minutes apart, and last 30 to 70 seconds each  · You have heavy vaginal bleeding  · You have a constant trickle or sudden gush of clear fluid from your vagina  · You notice a sudden decrease in your baby's movement  © 2014 [address] is for End User's use only and may not be sold, redistributed or otherwise used for commercial purposes  All illustrations and images included in CareNotes® are the copyrighted property of [employer]  or Mauricio Sosa  The above information is an  only  It is not intended as medical advice for individual conditions or treatments  Talk to your doctor, nurse or pharmacist before following any medical regimen to see if it is safe and effective for you

## 2018-02-16 ENCOUNTER — ROUTINE PRENATAL (OUTPATIENT)
Dept: OBGYN CLINIC | Facility: CLINIC | Age: 33
End: 2018-02-16

## 2018-02-16 VITALS — SYSTOLIC BLOOD PRESSURE: 130 MMHG | BODY MASS INDEX: 43.6 KG/M2 | WEIGHT: 254 LBS | DIASTOLIC BLOOD PRESSURE: 84 MMHG

## 2018-02-16 DIAGNOSIS — Z34.83 PRENATAL CARE, SUBSEQUENT PREGNANCY, THIRD TRIMESTER: Primary | ICD-10-CM

## 2018-02-16 PROCEDURE — PNV: Performed by: NURSE PRACTITIONER

## 2018-02-16 NOTE — PROGRESS NOTES
OFFICE VISIT: Pt feeling well  Planning on getting TDAP  Some Nausea but mild  Mild edema when on feet at work all day, resolves at rest  Denies LOF, VB, Cramping, HA, Vomiting,  Domestic Violence, Smoking, + FM  Tolerating PNV   RTO 2 weeks for GBS

## 2018-03-02 ENCOUNTER — ROUTINE PRENATAL (OUTPATIENT)
Dept: OBGYN CLINIC | Facility: CLINIC | Age: 33
End: 2018-03-02

## 2018-03-02 VITALS — DIASTOLIC BLOOD PRESSURE: 84 MMHG | BODY MASS INDEX: 43.94 KG/M2 | WEIGHT: 256 LBS | SYSTOLIC BLOOD PRESSURE: 128 MMHG

## 2018-03-02 DIAGNOSIS — Z3A.34 34 WEEKS GESTATION OF PREGNANCY: Primary | ICD-10-CM

## 2018-03-02 LAB — EXTERNAL GROUP B STREP ANTIGEN: NEGATIVE

## 2018-03-02 PROCEDURE — PNV: Performed by: NURSE PRACTITIONER

## 2018-03-02 NOTE — PROGRESS NOTES
OFFICE VISIT: Pt feeling well  Some mild nausea  Mild edema, resolves with rest  Occasional cramping  Denies any vomiting, HA,  VB, LOF,  Domestic Violence, Smoking  + FM  Tolerating PNV  Complaining of some sciatica reviewed stretches, massage, chiropractor  GBS done  RTO 2 weeks for labor talk

## 2018-03-05 LAB — GP B STREP GENITAL QL CULT: NEGATIVE

## 2018-03-12 ENCOUNTER — ULTRASOUND (OUTPATIENT)
Dept: PERINATAL CARE | Facility: CLINIC | Age: 33
End: 2018-03-12
Payer: COMMERCIAL

## 2018-03-12 ENCOUNTER — APPOINTMENT (OUTPATIENT)
Dept: PERINATAL CARE | Facility: CLINIC | Age: 33
End: 2018-03-12
Payer: COMMERCIAL

## 2018-03-12 VITALS
WEIGHT: 254.6 LBS | HEART RATE: 105 BPM | HEIGHT: 65 IN | BODY MASS INDEX: 42.42 KG/M2 | DIASTOLIC BLOOD PRESSURE: 85 MMHG | SYSTOLIC BLOOD PRESSURE: 133 MMHG

## 2018-03-12 DIAGNOSIS — Z3A.36 36 WEEKS GESTATION OF PREGNANCY: ICD-10-CM

## 2018-03-12 DIAGNOSIS — Z36.89 ENCOUNTER FOR ULTRASOUND TO CHECK FETAL GROWTH: ICD-10-CM

## 2018-03-12 DIAGNOSIS — O09.523 ELDERLY MULTIGRAVIDA IN THIRD TRIMESTER: ICD-10-CM

## 2018-03-12 DIAGNOSIS — O99.213 MATERNAL MORBID OBESITY, ANTEPARTUM, THIRD TRIMESTER (HCC): Primary | ICD-10-CM

## 2018-03-12 DIAGNOSIS — E66.01 MATERNAL MORBID OBESITY, ANTEPARTUM, THIRD TRIMESTER (HCC): Primary | ICD-10-CM

## 2018-03-12 PROCEDURE — 76816 OB US FOLLOW-UP PER FETUS: CPT | Performed by: OBSTETRICS & GYNECOLOGY

## 2018-03-12 PROCEDURE — 99212 OFFICE O/P EST SF 10 MIN: CPT | Performed by: OBSTETRICS & GYNECOLOGY

## 2018-03-12 PROCEDURE — 59025 FETAL NON-STRESS TEST: CPT | Performed by: OBSTETRICS & GYNECOLOGY

## 2018-03-12 NOTE — PROGRESS NOTES
[address] [facility]: Ms Amie Hess was seen today at 36w1d for NST (found under the pregnancy episode) which I reviewed the RN assessment and agree as well as growth ultrasound  See ultrasound report under "OB Procedures" tab  Please don't hesitate to contact our office with any concerns or questions    Davon Isaac MD

## 2018-03-16 ENCOUNTER — OFFICE VISIT (OUTPATIENT)
Dept: OBGYN CLINIC | Facility: CLINIC | Age: 33
End: 2018-03-16

## 2018-03-16 VITALS — SYSTOLIC BLOOD PRESSURE: 128 MMHG | DIASTOLIC BLOOD PRESSURE: 80 MMHG | BODY MASS INDEX: 43.53 KG/M2 | WEIGHT: 260 LBS

## 2018-03-16 DIAGNOSIS — O99.213 MATERNAL MORBID OBESITY, ANTEPARTUM, THIRD TRIMESTER (HCC): ICD-10-CM

## 2018-03-16 DIAGNOSIS — O09.523 ELDERLY MULTIGRAVIDA IN THIRD TRIMESTER: ICD-10-CM

## 2018-03-16 DIAGNOSIS — E66.01 MATERNAL MORBID OBESITY, ANTEPARTUM, THIRD TRIMESTER (HCC): ICD-10-CM

## 2018-03-16 DIAGNOSIS — Z3A.36 36 WEEKS GESTATION OF PREGNANCY: ICD-10-CM

## 2018-03-16 PROCEDURE — PNV: Performed by: OBSTETRICS & GYNECOLOGY

## 2018-03-19 ENCOUNTER — ROUTINE PRENATAL (OUTPATIENT)
Dept: PERINATAL CARE | Facility: CLINIC | Age: 33
End: 2018-03-19
Payer: COMMERCIAL

## 2018-03-19 VITALS
BODY MASS INDEX: 43.64 KG/M2 | DIASTOLIC BLOOD PRESSURE: 77 MMHG | WEIGHT: 255.6 LBS | HEART RATE: 100 BPM | SYSTOLIC BLOOD PRESSURE: 135 MMHG | HEIGHT: 64 IN

## 2018-03-19 DIAGNOSIS — O09.523 ELDERLY MULTIGRAVIDA IN THIRD TRIMESTER: ICD-10-CM

## 2018-03-19 DIAGNOSIS — O99.213 MATERNAL MORBID OBESITY, ANTEPARTUM, THIRD TRIMESTER (HCC): ICD-10-CM

## 2018-03-19 DIAGNOSIS — Z3A.37 37 WEEKS GESTATION OF PREGNANCY: ICD-10-CM

## 2018-03-19 DIAGNOSIS — E66.01 MATERNAL MORBID OBESITY, ANTEPARTUM, THIRD TRIMESTER (HCC): ICD-10-CM

## 2018-03-19 PROCEDURE — 76815 OB US LIMITED FETUS(S): CPT | Performed by: OBSTETRICS & GYNECOLOGY

## 2018-03-19 PROCEDURE — 59025 FETAL NON-STRESS TEST: CPT | Performed by: OBSTETRICS & GYNECOLOGY

## 2018-03-19 NOTE — PROGRESS NOTES
NST procedure and expected outcome explained to patient  Daily fetal kick count reviewed  Patient verbalized understanding of all and was receptive      Elena Collins RN

## 2018-03-23 ENCOUNTER — ROUTINE PRENATAL (OUTPATIENT)
Dept: OBGYN CLINIC | Facility: CLINIC | Age: 33
End: 2018-03-23

## 2018-03-23 VITALS — BODY MASS INDEX: 43.94 KG/M2 | SYSTOLIC BLOOD PRESSURE: 126 MMHG | DIASTOLIC BLOOD PRESSURE: 72 MMHG | WEIGHT: 256 LBS

## 2018-03-23 DIAGNOSIS — O09.523 ELDERLY MULTIGRAVIDA IN THIRD TRIMESTER: ICD-10-CM

## 2018-03-23 DIAGNOSIS — O99.213 MATERNAL MORBID OBESITY, ANTEPARTUM, THIRD TRIMESTER (HCC): ICD-10-CM

## 2018-03-23 DIAGNOSIS — Z3A.37 37 WEEKS GESTATION OF PREGNANCY: ICD-10-CM

## 2018-03-23 DIAGNOSIS — E66.01 MATERNAL MORBID OBESITY, ANTEPARTUM, THIRD TRIMESTER (HCC): ICD-10-CM

## 2018-03-23 PROCEDURE — PNV: Performed by: OBSTETRICS & GYNECOLOGY

## 2018-03-23 NOTE — PROGRESS NOTES
Patient reports good fm, no v, headache, cramping, bleeding, loss of fluid, edema, dom violence, or smoking  summer pnv  Has pnc follow up, some nausea overall doing well, return in 1 week or sooner as needed

## 2018-03-26 ENCOUNTER — OFFICE VISIT (OUTPATIENT)
Dept: PERINATAL CARE | Facility: CLINIC | Age: 33
End: 2018-03-26
Payer: COMMERCIAL

## 2018-03-26 VITALS
WEIGHT: 255.4 LBS | HEIGHT: 64 IN | SYSTOLIC BLOOD PRESSURE: 120 MMHG | HEART RATE: 111 BPM | BODY MASS INDEX: 43.6 KG/M2 | DIASTOLIC BLOOD PRESSURE: 88 MMHG

## 2018-03-26 DIAGNOSIS — Z3A.37 37 WEEKS GESTATION OF PREGNANCY: ICD-10-CM

## 2018-03-26 DIAGNOSIS — O99.213 MATERNAL MORBID OBESITY, ANTEPARTUM, THIRD TRIMESTER (HCC): ICD-10-CM

## 2018-03-26 DIAGNOSIS — E66.01 MATERNAL MORBID OBESITY, ANTEPARTUM, THIRD TRIMESTER (HCC): ICD-10-CM

## 2018-03-26 DIAGNOSIS — Z3A.38 38 WEEKS GESTATION OF PREGNANCY: Primary | ICD-10-CM

## 2018-03-26 PROCEDURE — 76815 OB US LIMITED FETUS(S): CPT | Performed by: OBSTETRICS & GYNECOLOGY

## 2018-03-26 PROCEDURE — 59025 FETAL NON-STRESS TEST: CPT | Performed by: OBSTETRICS & GYNECOLOGY

## 2018-03-26 NOTE — PROGRESS NOTES
NST procedure and expected outcome explained to patient  Daily fetal kick count reviewed  Patient verbalized understanding of all and was receptive      Nica Sotelo RN

## 2018-03-30 ENCOUNTER — ROUTINE PRENATAL (OUTPATIENT)
Dept: OBGYN CLINIC | Facility: CLINIC | Age: 33
End: 2018-03-30

## 2018-03-30 VITALS — SYSTOLIC BLOOD PRESSURE: 128 MMHG | WEIGHT: 260 LBS | DIASTOLIC BLOOD PRESSURE: 68 MMHG | BODY MASS INDEX: 44.63 KG/M2

## 2018-03-30 DIAGNOSIS — O09.523 ELDERLY MULTIGRAVIDA IN THIRD TRIMESTER: ICD-10-CM

## 2018-03-30 DIAGNOSIS — E66.01 MATERNAL MORBID OBESITY, ANTEPARTUM, THIRD TRIMESTER (HCC): ICD-10-CM

## 2018-03-30 DIAGNOSIS — Z3A.38 38 WEEKS GESTATION OF PREGNANCY: ICD-10-CM

## 2018-03-30 DIAGNOSIS — O99.213 MATERNAL MORBID OBESITY, ANTEPARTUM, THIRD TRIMESTER (HCC): ICD-10-CM

## 2018-03-30 PROCEDURE — PNV: Performed by: NURSE PRACTITIONER

## 2018-03-30 NOTE — PROGRESS NOTES
OFFICE VISIT: Feeling great  Occasional nausea but manageable  Denies any N/V, HA, VB, LOF, Edema, Domestic Violence, Smoking  + FM  Tolerating PNV  Pt has weekly NSTs for AMA and obesity at [facility]  RTO 1 week for routine prenatal care or sooner as needed

## 2018-04-03 ENCOUNTER — ROUTINE PRENATAL (OUTPATIENT)
Dept: PERINATAL CARE | Facility: CLINIC | Age: 33
End: 2018-04-03
Payer: COMMERCIAL

## 2018-04-03 VITALS
DIASTOLIC BLOOD PRESSURE: 77 MMHG | BODY MASS INDEX: 44.15 KG/M2 | SYSTOLIC BLOOD PRESSURE: 119 MMHG | HEART RATE: 112 BPM | HEIGHT: 64 IN | WEIGHT: 258.6 LBS

## 2018-04-03 DIAGNOSIS — E66.01 MATERNAL MORBID OBESITY, ANTEPARTUM, THIRD TRIMESTER (HCC): ICD-10-CM

## 2018-04-03 DIAGNOSIS — Z3A.38 38 WEEKS GESTATION OF PREGNANCY: Primary | ICD-10-CM

## 2018-04-03 DIAGNOSIS — O09.523 ELDERLY MULTIGRAVIDA IN THIRD TRIMESTER: ICD-10-CM

## 2018-04-03 DIAGNOSIS — Z3A.37 37 WEEKS GESTATION OF PREGNANCY: ICD-10-CM

## 2018-04-03 DIAGNOSIS — O99.213 MATERNAL MORBID OBESITY, ANTEPARTUM, THIRD TRIMESTER (HCC): ICD-10-CM

## 2018-04-03 PROCEDURE — 76815 OB US LIMITED FETUS(S): CPT | Performed by: OBSTETRICS & GYNECOLOGY

## 2018-04-03 PROCEDURE — 59025 FETAL NON-STRESS TEST: CPT | Performed by: OBSTETRICS & GYNECOLOGY

## 2018-04-03 NOTE — PROGRESS NOTES
John Rubio was evaluated with nonstress testing and amniotic fluid volume assessment for the indication of morbid obesity

## 2018-04-06 ENCOUNTER — ROUTINE PRENATAL (OUTPATIENT)
Dept: OBGYN CLINIC | Facility: CLINIC | Age: 33
End: 2018-04-06

## 2018-04-06 VITALS — BODY MASS INDEX: 44.29 KG/M2 | DIASTOLIC BLOOD PRESSURE: 82 MMHG | WEIGHT: 258 LBS | SYSTOLIC BLOOD PRESSURE: 124 MMHG

## 2018-04-06 DIAGNOSIS — E66.01 MATERNAL MORBID OBESITY, ANTEPARTUM, THIRD TRIMESTER (HCC): ICD-10-CM

## 2018-04-06 DIAGNOSIS — O09.523 ELDERLY MULTIGRAVIDA IN THIRD TRIMESTER: Primary | ICD-10-CM

## 2018-04-06 DIAGNOSIS — O99.213 MATERNAL MORBID OBESITY, ANTEPARTUM, THIRD TRIMESTER (HCC): ICD-10-CM

## 2018-04-06 DIAGNOSIS — Z3A.37 37 WEEKS GESTATION OF PREGNANCY: ICD-10-CM

## 2018-04-06 PROCEDURE — PNV: Performed by: PHYSICIAN ASSISTANT

## 2018-04-09 ENCOUNTER — ROUTINE PRENATAL (OUTPATIENT)
Dept: PERINATAL CARE | Facility: CLINIC | Age: 33
End: 2018-04-09
Payer: COMMERCIAL

## 2018-04-09 ENCOUNTER — HOSPITAL ENCOUNTER (OUTPATIENT)
Facility: HOSPITAL | Age: 33
Discharge: HOME | End: 2018-04-09
Attending: OBSTETRICS & GYNECOLOGY | Admitting: OBSTETRICS & GYNECOLOGY
Payer: COMMERCIAL

## 2018-04-09 ENCOUNTER — TELEPHONE (OUTPATIENT)
Dept: OBGYN CLINIC | Facility: CLINIC | Age: 33
End: 2018-04-09

## 2018-04-09 VITALS
BODY MASS INDEX: 44.15 KG/M2 | SYSTOLIC BLOOD PRESSURE: 161 MMHG | WEIGHT: 258.6 LBS | DIASTOLIC BLOOD PRESSURE: 79 MMHG | HEIGHT: 64 IN | HEART RATE: 97 BPM

## 2018-04-09 VITALS
HEART RATE: 103 BPM | TEMPERATURE: 98 F | DIASTOLIC BLOOD PRESSURE: 86 MMHG | RESPIRATION RATE: 18 BRPM | SYSTOLIC BLOOD PRESSURE: 148 MMHG

## 2018-04-09 DIAGNOSIS — O99.213 MATERNAL MORBID OBESITY, ANTEPARTUM, THIRD TRIMESTER (HCC): Primary | ICD-10-CM

## 2018-04-09 DIAGNOSIS — E66.01 MATERNAL MORBID OBESITY, ANTEPARTUM, THIRD TRIMESTER (HCC): Primary | ICD-10-CM

## 2018-04-09 LAB
ALBUMIN SERPL BCP-MCNC: 2.8 G/DL (ref 3.5–5)
ALP SERPL-CCNC: 95 U/L (ref 46–116)
ALT SERPL W P-5'-P-CCNC: 18 U/L (ref 12–78)
ANION GAP SERPL CALCULATED.3IONS-SCNC: 5 MMOL/L (ref 4–13)
AST SERPL W P-5'-P-CCNC: 13 U/L (ref 5–45)
BACTERIA UR QL AUTO: ABNORMAL /HPF
BASOPHILS # BLD AUTO: 0.03 THOUSANDS/ΜL (ref 0–0.1)
BASOPHILS NFR BLD AUTO: 0 % (ref 0–1)
BILIRUB SERPL-MCNC: 0.31 MG/DL (ref 0.2–1)
BILIRUB UR QL STRIP: NEGATIVE
BUN SERPL-MCNC: 6 MG/DL (ref 5–25)
CALCIUM SERPL-MCNC: 8.8 MG/DL
CHLORIDE SERPL-SCNC: 106 MMOL/L (ref 100–108)
CLARITY UR: CLEAR
CO2 SERPL-SCNC: 24 MMOL/L (ref 21–32)
COLOR UR: YELLOW
CREAT SERPL-MCNC: 0.5 MG/DL (ref 0.6–1.3)
CREAT UR-MCNC: 53.2 MG/DL
EOSINOPHIL # BLD AUTO: 0.16 THOUSAND/ΜL (ref 0–0.61)
EOSINOPHIL NFR BLD AUTO: 2 % (ref 0–6)
ERYTHROCYTE [DISTWIDTH] IN BLOOD BY AUTOMATED COUNT: 14 % (ref 11.6–15.1)
GFR SERPL CREATININE-BSD FRML MDRD: 126 ML/MIN/1.73SQ M
GLUCOSE P FAST SERPL-MCNC: 82 MG/DL (ref 65–99)
GLUCOSE SERPL-MCNC: 82 MG/DL (ref 65–140)
GLUCOSE UR STRIP-MCNC: NEGATIVE MG/DL
HCT VFR BLD AUTO: 36.5 % (ref 34.8–46.1)
HGB BLD-MCNC: 12.4 G/DL (ref 11.5–15.4)
HGB UR QL STRIP.AUTO: NEGATIVE
HYALINE CASTS #/AREA URNS LPF: ABNORMAL /LPF
KETONES UR STRIP-MCNC: NEGATIVE MG/DL
LEUKOCYTE ESTERASE UR QL STRIP: ABNORMAL
LYMPHOCYTES # BLD AUTO: 1.67 THOUSANDS/ΜL (ref 0.6–4.47)
LYMPHOCYTES NFR BLD AUTO: 16 % (ref 14–44)
MCH RBC QN AUTO: 29 PG (ref 26.8–34.3)
MCHC RBC AUTO-ENTMCNC: 34 G/DL (ref 31.4–37.4)
MCV RBC AUTO: 86 FL (ref 82–98)
MONOCYTES # BLD AUTO: 0.66 THOUSAND/ΜL (ref 0.17–1.22)
MONOCYTES NFR BLD AUTO: 6 % (ref 4–12)
NEUTROPHILS # BLD AUTO: 7.89 THOUSANDS/ΜL (ref 1.85–7.62)
NEUTS SEG NFR BLD AUTO: 76 % (ref 43–75)
NITRITE UR QL STRIP: NEGATIVE
NON-SQ EPI CELLS URNS QL MICRO: ABNORMAL /HPF
NRBC BLD AUTO-RTO: 0 /100 WBCS
PH UR STRIP.AUTO: 7.5 [PH] (ref 4.5–8)
PLATELET # BLD AUTO: 205 THOUSANDS/UL (ref 149–390)
PMV BLD AUTO: 9.9 FL (ref 8.9–12.7)
POTASSIUM SERPL-SCNC: 4 MMOL/L (ref 3.5–5.3)
PROT SERPL-MCNC: 7.4 G/DL (ref 6.4–8.2)
PROT UR STRIP-MCNC: NEGATIVE MG/DL
PROT UR-MCNC: 9 MG/DL
PROT/CREAT UR: 0.17 MG/G{CREAT} (ref 0–0.1)
RBC # BLD AUTO: 4.27 MILLION/UL (ref 3.81–5.12)
RBC #/AREA URNS AUTO: ABNORMAL /HPF
SODIUM SERPL-SCNC: 135 MMOL/L (ref 136–145)
SP GR UR STRIP.AUTO: 1.01 (ref 1–1.03)
URATE SERPL-MCNC: 4.2 MG/DL (ref 2–6.8)
UROBILINOGEN UR QL STRIP.AUTO: 0.2 E.U./DL
WBC # BLD AUTO: 10.45 THOUSAND/UL (ref 4.31–10.16)
WBC #/AREA URNS AUTO: ABNORMAL /HPF

## 2018-04-09 PROCEDURE — 76815 OB US LIMITED FETUS(S): CPT | Performed by: OBSTETRICS & GYNECOLOGY

## 2018-04-09 PROCEDURE — 59025 FETAL NON-STRESS TEST: CPT | Performed by: OBSTETRICS & GYNECOLOGY

## 2018-04-09 PROCEDURE — 85025 COMPLETE CBC W/AUTO DIFF WBC: CPT | Performed by: OBSTETRICS & GYNECOLOGY

## 2018-04-09 PROCEDURE — 99214 OFFICE O/P EST MOD 30 MIN: CPT

## 2018-04-09 PROCEDURE — G0463 HOSPITAL OUTPT CLINIC VISIT: HCPCS

## 2018-04-09 PROCEDURE — 81001 URINALYSIS AUTO W/SCOPE: CPT | Performed by: OBSTETRICS & GYNECOLOGY

## 2018-04-09 PROCEDURE — 84156 ASSAY OF PROTEIN URINE: CPT | Performed by: OBSTETRICS & GYNECOLOGY

## 2018-04-09 PROCEDURE — 84550 ASSAY OF BLOOD/URIC ACID: CPT | Performed by: OBSTETRICS & GYNECOLOGY

## 2018-04-09 PROCEDURE — 82570 ASSAY OF URINE CREATININE: CPT | Performed by: OBSTETRICS & GYNECOLOGY

## 2018-04-09 PROCEDURE — 80053 COMPREHEN METABOLIC PANEL: CPT | Performed by: OBSTETRICS & GYNECOLOGY

## 2018-04-09 NOTE — PROGRESS NOTES
[address] [address]: Ms Catracho Bermudez was seen today at 40w1d for NST (found under the pregnancy episode) which I reviewed the RN assessment and agree, and MACARENA (see ultrasound report under OB procedures tab)  Blood pressure today was elevated and she left without us recognizing this  I called the patient who is asymptomatic and I asked her to either go to her doctors or L&D for evaluation; L&D is best for her doctors office so office staff is letting Dr Reilly First know  I suggest rechecking BP and also labs given late gestational age  Pt in agreement and will go to L&D Please don't hesitate to contact our office with any concerns or questions    Kyler Trejo MD

## 2018-04-09 NOTE — PATIENT INSTRUCTIONS
Please continue weekly testing (NSTs and fluid checks) until delivery  However, given your elevated pressure today, I would like you to go to Labor and Delivery now for evaluation

## 2018-04-09 NOTE — PROGRESS NOTES
Triage Note - OB  Markel Contreras 26 y o  female MRN: [id number]  Unit/Bed#: LD Triage 3- Encounter: [id number]    Chief Complaint:   Chief Complaint   Patient presents with    Hypertension     elevated in  center, sent over for evaluation  Denies HA, changes in vision, increased swelling or epigastric pain     ADDISON: Estimated Date of Delivery: 18    HPI: 26 y o  female  at 40w1d presents with elevated blood pressure at [facility] center  Pt states she had an elevated pressure of 161/79 and was told something was wrong with the cuff and that they would repeat it, however, they did not  By the time she left and was on her way home, she received a call telling her to go to L&D for repeat BP check  She asked if she could just go to her OB office, but they told her to come here  Pt denies any BP issues in this pregnancy  She denies headache, changes in vision, chest pain, SOB, RUQ/Epigastric pain  She denies vaginal bleeding, LOF, or contractions  Reports positive fetal movement  Vitals: Blood pressure 147/83, pulse (!) 108, temperature 98 °F (36 7 °C), temperature source Oral, resp  rate 18, last menstrual period 2017, currently breastfeeding  ,There is no height or weight on file to calculate BMI      Physical Exam  GEN: well developed and well nourished, alert, oriented times 3 and appears comfortable  Heart: Regular rate and rhythm, S1S2 present or without murmur or extra heart sounds   Lungs: clear to auscultation bilaterally, no wheezes, no rhonchi and normal symmetric air entry  Abd: soft, non tender and gravid  SVE: deferred    FHR: 130bpm, moderate variability, positive accels, no decels, reactive  [place]: irritability    Labs:   Admission on 2018   Component Date Value    WBC 2018 10 45*    RBC 2018 4 27     Hemoglobin 2018 12 4     Hematocrit 2018 36 5     MCV 2018 86     MCH 2018 29 0     MCHC 2018 34 0     RDW 2018 14 0     MPV 2018 9 9     Platelets  205     nRBC 2018 0     Neutrophils Relative 2018 76*    Lymphocytes Relative 2018 16     Monocytes Relative 2018 6     Eosinophils Relative 2018 2     Basophils Relative 2018 0     Neutrophils Absolute 2018 7 89*    Lymphocytes Absolute 2018 1 67     Monocytes Absolute 2018 0 66     Eosinophils Absolute 2018 0 16     Basophils Absolute 2018 0 03     Sodium 2018 135*    Potassium 2018 4 0     Chloride 2018 106     CO2 2018 24     Anion Gap 2018 5     BUN 2018 6     Creatinine 2018 0 50*    Glucose 2018 82     Glucose, Fasting 2018 82     Calcium 2018 8 8     AST 2018 13     ALT 2018 18     Alkaline Phosphatase 2018 95     Total Protein 2018 7 4     Albumin 2018 2 8*    Total Bilirubin 2018 0 31     eGFR 2018 126     Clarity, UA 2018 Clear     Color, UA 2018 Yellow     Specific Gravity, UA 2018 1 009     pH, UA 2018 7 5     Glucose, UA 2018 Negative     Ketones, UA 2018 Negative     Blood, UA 2018 Negative     Protein, UA 2018 Negative     Nitrite, UA 2018 Negative     Bilirubin, UA 2018 Negative     Urobilinogen, UA 2018 0 2     Leukocytes, UA 2018 Trace*    WBC, UA 2018 None Seen     RBC, UA 2018 None Seen     Hyaline Casts, UA 2018 None Seen     Bacteria, UA 2018 Occasional     Epithelial Cells 2018 None Seen     Uric Acid 2018 4 2     Creatinine, Ur 2018 53 2     Protein Urine Random 2018 9     Prot/Creat Ratio, Ur 2018 0 17*    External Strep Group B Ag 2018 Negative        Lab, Imaging and other studies: I have personally reviewed pertinent reports      A/P: 26 y o  female  at 40w1d with elevated blood pressures affecting pregnancy  1)Elevated Bps  Pre-eclamptic labs WNL; p/c ratio 0 17  Monitor BPS: 130s-140s/60s-80s while in triage  Pt asymptomatic  Pt to call office and will have IOL set up for later this week  2)IUP at 40w1d  NST reactive  3) Discharge instructions given to patient and labor precautions reviewed      D/w Dr Jane Pedroza MD  OBGYN, PGY-1  4/9/2018 2:44 PM

## 2018-04-09 NOTE — PROGRESS NOTES
Spoke with Dr Gaylene Litten regarding preclamptic labs and reviewed blood pressures with her  Dr Gaylene Litten ok with patient going home, and will schedule induction for sometime this week

## 2018-04-09 NOTE — TELEPHONE ENCOUNTER
lmom for patient, trying to get scheduled for this Thursday  Will have definitive answer for her tomorrow morning

## 2018-04-12 ENCOUNTER — TELEPHONE (OUTPATIENT)
Dept: OBGYN CLINIC | Facility: CLINIC | Age: 33
End: 2018-04-12

## 2018-04-14 ENCOUNTER — HOSPITAL ENCOUNTER (INPATIENT)
Facility: HOSPITAL | Age: 33
LOS: 3 days | Discharge: HOME | End: 2018-04-17
Attending: OBSTETRICS & GYNECOLOGY | Admitting: OBSTETRICS & GYNECOLOGY
Payer: COMMERCIAL

## 2018-04-14 ENCOUNTER — HOSPITAL ENCOUNTER (OUTPATIENT)
Dept: LABOR AND DELIVERY | Facility: HOSPITAL | Age: 33
Discharge: HOME | End: 2018-04-14
Payer: COMMERCIAL

## 2018-04-14 ENCOUNTER — ANESTHESIA EVENT (INPATIENT)
Dept: LABOR AND DELIVERY | Facility: HOSPITAL | Age: 33
End: 2018-04-14
Payer: COMMERCIAL

## 2018-04-14 ENCOUNTER — ANESTHESIA (INPATIENT)
Dept: LABOR AND DELIVERY | Facility: HOSPITAL | Age: 33
End: 2018-04-14
Payer: COMMERCIAL

## 2018-04-14 DIAGNOSIS — Z3A.40 40 WEEKS GESTATION OF PREGNANCY: ICD-10-CM

## 2018-04-14 LAB
ABO GROUP BLD: NORMAL
BASOPHILS # BLD AUTO: 0.02 THOUSANDS/ΜL (ref 0–0.1)
BASOPHILS NFR BLD AUTO: 0 % (ref 0–1)
BLD GP AB SCN SERPL QL: NEGATIVE
EOSINOPHIL # BLD AUTO: 0.19 THOUSAND/ΜL (ref 0–0.61)
EOSINOPHIL NFR BLD AUTO: 2 % (ref 0–6)
ERYTHROCYTE [DISTWIDTH] IN BLOOD BY AUTOMATED COUNT: 14 % (ref 11.6–15.1)
HCT VFR BLD AUTO: 36.6 % (ref 34.8–46.1)
HGB BLD-MCNC: 12.8 G/DL (ref 11.5–15.4)
LYMPHOCYTES # BLD AUTO: 1.7 THOUSANDS/ΜL (ref 0.6–4.47)
LYMPHOCYTES NFR BLD AUTO: 15 % (ref 14–44)
MCH RBC QN AUTO: 29.4 PG (ref 26.8–34.3)
MCHC RBC AUTO-ENTMCNC: 35 G/DL (ref 31.4–37.4)
MCV RBC AUTO: 84 FL (ref 82–98)
MONOCYTES # BLD AUTO: 0.78 THOUSAND/ΜL (ref 0.17–1.22)
MONOCYTES NFR BLD AUTO: 7 % (ref 4–12)
NEUTROPHILS # BLD AUTO: 8.28 THOUSANDS/ΜL (ref 1.85–7.62)
NEUTS SEG NFR BLD AUTO: 76 % (ref 43–75)
NRBC BLD AUTO-RTO: 0 /100 WBCS
PLATELET # BLD AUTO: 200 THOUSANDS/UL (ref 149–390)
PMV BLD AUTO: 10.4 FL (ref 8.9–12.7)
RBC # BLD AUTO: 4.35 MILLION/UL (ref 3.81–5.12)
RH BLD: POSITIVE
SPECIMEN EXPIRATION DATE: NORMAL
WBC # BLD AUTO: 11.04 THOUSAND/UL (ref 4.31–10.16)

## 2018-04-14 PROCEDURE — 59400 OBSTETRICAL CARE: CPT | Performed by: OBSTETRICS & GYNECOLOGY

## 2018-04-14 PROCEDURE — 86850 RBC ANTIBODY SCREEN: CPT | Performed by: OBSTETRICS & GYNECOLOGY

## 2018-04-14 PROCEDURE — 86900 BLOOD TYPING SEROLOGIC ABO: CPT | Performed by: OBSTETRICS & GYNECOLOGY

## 2018-04-14 PROCEDURE — 86901 BLOOD TYPING SEROLOGIC RH(D): CPT | Performed by: OBSTETRICS & GYNECOLOGY

## 2018-04-14 PROCEDURE — 85025 COMPLETE CBC W/AUTO DIFF WBC: CPT | Performed by: OBSTETRICS & GYNECOLOGY

## 2018-04-14 PROCEDURE — 86592 SYPHILIS TEST NON-TREP QUAL: CPT | Performed by: OBSTETRICS & GYNECOLOGY

## 2018-04-14 RX ORDER — MONTELUKAST SODIUM 10 MG/1
10 TABLET ORAL DAILY PRN
Status: DISCONTINUED | OUTPATIENT
Start: 2018-04-14 | End: 2018-04-17 | Stop reason: HOSPADM

## 2018-04-14 RX ORDER — ALBUTEROL SULFATE 90 UG/1
1 AEROSOL, METERED RESPIRATORY (INHALATION) EVERY 4 HOURS PRN
Status: DISCONTINUED | OUTPATIENT
Start: 2018-04-14 | End: 2018-04-17 | Stop reason: HOSPADM

## 2018-04-14 RX ORDER — CLONIDINE 100 UG/ML
INJECTION, SOLUTION EPIDURAL AS NEEDED
Status: DISCONTINUED | OUTPATIENT
Start: 2018-04-14 | End: 2018-04-15 | Stop reason: SURG

## 2018-04-14 RX ORDER — OXYTOCIN/RINGER'S LACTATE 30/500 ML
1-30 PLASTIC BAG, INJECTION (ML) INTRAVENOUS
Status: DISCONTINUED | OUTPATIENT
Start: 2018-04-14 | End: 2018-04-15 | Stop reason: SDUPTHER

## 2018-04-14 RX ORDER — BUPIVACAINE HYDROCHLORIDE 2.5 MG/ML
INJECTION, SOLUTION INFILTRATION; PERINEURAL AS NEEDED
Status: DISCONTINUED | OUTPATIENT
Start: 2018-04-14 | End: 2018-04-15 | Stop reason: SURG

## 2018-04-14 RX ORDER — SODIUM CHLORIDE, SODIUM LACTATE, POTASSIUM CHLORIDE, CALCIUM CHLORIDE 600; 310; 30; 20 MG/100ML; MG/100ML; MG/100ML; MG/100ML
125 INJECTION, SOLUTION INTRAVENOUS CONTINUOUS
Status: DISCONTINUED | OUTPATIENT
Start: 2018-04-14 | End: 2018-04-15

## 2018-04-14 RX ORDER — ONDANSETRON 2 MG/ML
4 INJECTION INTRAMUSCULAR; INTRAVENOUS EVERY 6 HOURS PRN
Status: DISCONTINUED | OUTPATIENT
Start: 2018-04-14 | End: 2018-04-15

## 2018-04-14 RX ORDER — ALBUTEROL SULFATE 2.5 MG/3ML
2.5 SOLUTION RESPIRATORY (INHALATION) EVERY 4 HOURS PRN
Status: DISCONTINUED | OUTPATIENT
Start: 2018-04-14 | End: 2018-04-14

## 2018-04-14 RX ADMIN — Medication 12 ML/HR: at 18:10

## 2018-04-14 RX ADMIN — SODIUM CHLORIDE, SODIUM LACTATE, POTASSIUM CHLORIDE, AND CALCIUM CHLORIDE 125 ML/HR: .6; .31; .03; .02 INJECTION, SOLUTION INTRAVENOUS at 12:43

## 2018-04-14 RX ADMIN — SODIUM CHLORIDE, SODIUM LACTATE, POTASSIUM CHLORIDE, AND CALCIUM CHLORIDE 125 ML/HR: .6; .31; .03; .02 INJECTION, SOLUTION INTRAVENOUS at 18:39

## 2018-04-14 RX ADMIN — SODIUM CHLORIDE, SODIUM LACTATE, POTASSIUM CHLORIDE, AND CALCIUM CHLORIDE 125 ML/HR: .6; .31; .03; .02 INJECTION, SOLUTION INTRAVENOUS at 16:08

## 2018-04-14 RX ADMIN — ROPIVACAINE HYDROCHLORIDE: 2 INJECTION, SOLUTION EPIDURAL; INFILTRATION at 13:24

## 2018-04-14 RX ADMIN — CLONIDINE 100 MCG: 100 INJECTION, SOLUTION EPIDURAL at 15:05

## 2018-04-14 RX ADMIN — Medication 2 MILLI-UNITS/MIN: at 08:56

## 2018-04-14 RX ADMIN — SODIUM CHLORIDE, SODIUM LACTATE, POTASSIUM CHLORIDE, AND CALCIUM CHLORIDE 125 ML/HR: .6; .31; .03; .02 INJECTION, SOLUTION INTRAVENOUS at 21:20

## 2018-04-14 RX ADMIN — SODIUM CHLORIDE, SODIUM LACTATE, POTASSIUM CHLORIDE, AND CALCIUM CHLORIDE 125 ML/HR: .6; .31; .03; .02 INJECTION, SOLUTION INTRAVENOUS at 08:20

## 2018-04-14 RX ADMIN — BUPIVACAINE HYDROCHLORIDE 5 ML: 2.5 INJECTION, SOLUTION INFILTRATION; PERINEURAL at 16:37

## 2018-04-14 RX ADMIN — BUPIVACAINE HYDROCHLORIDE 5 ML: 2.5 INJECTION, SOLUTION INFILTRATION; PERINEURAL at 15:05

## 2018-04-14 RX ADMIN — CLONIDINE 100 MCG: 100 INJECTION, SOLUTION EPIDURAL at 16:37

## 2018-04-14 NOTE — OB LABOR/OXYTOCIN SAFETY PROGRESS
Oxytocin Safety Progress Check Note - Tyree Gravely 26 y o  female MRN: [id number]    Unit/Bed#: -01 Encounter: [id number]    Obstetric History       T1      L1     SAB2   TAB0   Ectopic0   Multiple0   Live Births1    Obstetric Comments   Asthma, elderly multigravida     Gestational Age: 38w9d  Dose (chris-units/min) Oxytocin: 10 chris-units/min  Contraction Frequency (minutes): 2-3  Contraction Quality: Mild  Tachysystole: No   Dilation:  (deferred)        Effacement (%):  (deferred)  Station:  (deferred)  Baseline Rate: 140 bpm  Fetal Heart Rate: 150 BPM  FHR Category: Category I     Oxytocin Safety Progress Check: Safety check completed    Notes/comments:   Pt comfortable, starting to feel contractions  Rats 3/10  Will defer SVE at this time   Continue current managment          Tvein Swanson MD 2018 11:14 AM

## 2018-04-14 NOTE — OB LABOR/OXYTOCIN SAFETY PROGRESS
Oxytocin Safety Progress Check Note - Yajaira Gregory 26 y o  female MRN: [id number]    Unit/Bed#: -01 Encounter: [id number]    Obstetric History       T1      L1     SAB2   TAB0   Ectopic0   Multiple0   Live Births1    Obstetric Comments   Asthma, elderly multigravida     Gestational Age: 38w9d  Dose (chris-units/min) Oxytocin: 12 chris-units/min  Contraction Frequency (minutes): 1-2  Contraction Quality: Moderate  Tachysystole: Yes   Dilation: 4        Effacement (%): 80  Station: -1  Baseline Rate: 135 bpm  Fetal Heart Rate: 130 BPM  FHR Category: Category I     Oxytocin Safety Progress Check: Safety check completed    Notes/comments:   Pt still very uncomfortable after 3rd attempt at epidural for which she received fentanyl  Anesthesia in room to place CSE  SVE as above  MVUS 260    Provider Notified: Yes  Provider Name: Dr Sudhakar Locke and Dr Valerie Davis MD 2018 6:52 PM

## 2018-04-14 NOTE — PROGRESS NOTES
Safety huddle done  Plan is to decrease pitocin to 14 and turn patient to her left side with use of peanut ball per Dr Claudean Gaunt

## 2018-04-14 NOTE — OB LABOR/OXYTOCIN SAFETY PROGRESS
Oxytocin Safety Progress Check Note - Elise Adamson 26 y o  female MRN: [id number]    Unit/Bed#: -01 Encounter: [id number]    Obstetric History       T1      L1     SAB2   TAB0   Ectopic0   Multiple0   Live Births1    Obstetric Comments   Asthma, elderly multigravida     Gestational Age: 38w9d  Dose (chris-units/min) Oxytocin: 16 chris-units/min  Contraction Frequency (minutes): 2-3  Contraction Quality: Moderate  Tachysystole: No   Dilation: 2        Effacement (%): 80  Station: -1  Baseline Rate: 140 bpm  Fetal Heart Rate: 135 BPM  FHR Category: Category I     Oxytocin Safety Progress Check: Safety check completed    Notes/comments:   Arom clear , patient getting more comfortable with epidural, expectant management            Harlan Ureña MD 2018 1:58 PM

## 2018-04-14 NOTE — OB LABOR/OXYTOCIN SAFETY PROGRESS
Oxytocin Safety Progress Check Note - Charline Rowland 26 y o  female MRN: [id number]  Huddle Note  Unit/Bed#: -01 Encounter: [id number]    Obstetric History       T1      L1     SAB2   TAB0   Ectopic0   Multiple0   Live Births1    Obstetric Comments   Asthma, elderly multigravida     Gestational Age: 38w9d  Dose (chris-units/min) Oxytocin: 20 chris-units/min  Contraction Frequency (minutes): 1-2  Contraction Quality: Moderate  Tachysystole: No   Dilation: 3        Effacement (%): 80  Station: -1  Baseline Rate: 140 bpm  Fetal Heart Rate: 140 BPM  FHR Category: Category I     Oxytocin Safety Progress Check: Safety check completed    Notes/comments:      Patient had a period of minimal variability following epidural medication administration  Overall reassuring  Also appears to be resolving, reviewed with nurses resident and charge nurse  Will continue current management and continue observation         Ranjit Bell MD 2018 3:41 PM

## 2018-04-14 NOTE — PROGRESS NOTES
SAFETY HUDDLE    TRIGGER: minimal variability of FHR for 30 minutes    PARTICIPANTS: Jose Cervantes (sr resident), Janeth Balbuena RN (charge nurse), Noemi Ha RN (labor nurse), and Dr Ivon Banegas (attending physician)    REVIEW OF CURRENT PLAN OF CARE AND MANAGEMENT:   Due to patient's continuing discomfort during her labor course, her epidural was recently discontinued and replaced by anesthesia  During the replacement patient was bolused with fentanyl to achieve pain control  This is likely contributing to the minimal variability seen on fetal heart tracing  Patient currently has an IUPC to monitor uterine contractions  Recent MVUs range between 250-300  Plan for intervention includes to reduce patient's Pitocin from 18 down to 14 milliunits per minute  As well as turning patient to left side to achieve better pain control with epidural      TIMELINE FOR NEXT ASSESSMENT:  Fetal heart tracing showed significant improvement with moderate variability noted after patient received a small amount of juice and was turned onto her side  Next assessment will be as needed for patient's current induction of labor plan  ALL PARTICIPANTS IN AGREEMENT WITH PLAN OF CARE: yes    IS PERRT REQUIRED: no    Lottie Cervantes MD  OBGYN, PGY3  4/14/2018 6:14 PM

## 2018-04-14 NOTE — DISCHARGE SUMMARY
Discharge Summary - Elise Adamson 26 y o  female MRN: [id number]    Unit/Bed#: -01 Encounter: [id number]    Admission Date: 2018     Discharge Date: 2018    Admitting Diagnosis:   1  Pregnancy at 40w6d  2  Induced Labor  3  AMA  4  Obesity  5  Asthma    Discharge Diagnosis: same, delivered    Procedures: spontaneous vaginal delivery, repair of 2nd degree laceration    Attending: Dr Trace Richards Course: Elise Adamson is a 26 y o  [id number] at 40w6d wks who was initially admitted for induction of labor for late term  She was started on pitocin titration  Pt received epidural for anesthesia  Amniotomy was performed at [id number] for clear fluid  Pt progressed to complete dilation  She delivered a viable female  on 4/15/15 at [address] [place] [address]  Weight 7lbs 8oz via spontaneous vaginal delivery  Apgars were 9 (1 min) and 9 (5 min)  Patient tolerated the procedure well and was transferred to recovery in stable condition  Her postpartum course was uncomplicated  Predelivery hemoglobin was 12 8  Her postpartum pain was well controlled with oral analgesics  On day of discharge, she was ambulating and able to reasonably perform all ADLs  She was voiding and had appropriate bowel function  Pain was well controlled  She was discharged home on postpartum day #2 without complications  Patient was instructed to follow up with her OB as an outpatient and was given appropriate warnings to call provider if she develops signs of infection or uncontrolled pain  Complications: none apparent    Condition at discharge: good     Discharge instructions/Information to patient and family:   See after visit summary for information provided to patient and family  Provisions for Follow-Up Care:  See after visit summary for information related to follow-up care and any pertinent home health orders        Disposition: Home    Planned Readmission: Jordyn Haywood MD

## 2018-04-14 NOTE — OB LABOR/OXYTOCIN SAFETY PROGRESS
Oxytocin Safety Progress Check Note - Tyree Gravely 26 y o  female MRN: [id number]    Unit/Bed#: -01 Encounter: [id number]    Obstetric History       T1      L1     SAB2   TAB0   Ectopic0   Multiple0   Live Births1    Obstetric Comments   Asthma, elderly multigravida     Gestational Age: 38w9d  Dose (chris-units/min) Oxytocin: 18 chris-units/min  Contraction Frequency (minutes): 2-3  Contraction Quality: Moderate  Tachysystole: No   Dilation: 3        Effacement (%): 80  Station: -1  Baseline Rate: 140 bpm  Fetal Heart Rate: 135 BPM  FHR Category: Category I     Oxytocin Safety Progress Check: Safety check completed    Notes/comments:   Patient much more comfortable now, epidural replaced, kink in line, expectant management            Kady Walsh MD 2018 3:18 PM

## 2018-04-14 NOTE — ANESTHESIA PROCEDURE NOTES
Epidural Block    Patient location during procedure: OB  Start time: 4/14/2018 1:10 PM  Reason for block: procedure for pain, at surgeon's request, post-op pain management and primary anesthetic  Staffing  Anesthesiologist: Gonsalo Shoulder  Performed: anesthesiologist   Preanesthetic Checklist  Completed: patient identified, site marked, surgical consent, pre-op evaluation, timeout performed, IV checked, risks and benefits discussed and monitors and equipment checked  Epidural  Patient position: sitting  Prep: ChloraPrep  Patient monitoring: heart rate, cardiac monitor, continuous pulse ox and frequent blood pressure checks  Approach: midline  Location: lumbar (1-5)  Injection technique: ZELDA air  Needle  Needle type: Tuohy   Needle gauge: 18 G  Catheter type: end hole  Catheter size: 20 G  Catheter at skin depth: 12 cm  Test dose: negative and lidocaine 1 5% with epinephrine 1-to-200,000  Assessment  Sensory level: [id number] aspiration for CSF, negative aspiration for heme and no paresthesia on injection  patient tolerated the procedure well with no immediate complications

## 2018-04-14 NOTE — PROGRESS NOTES
Previous kramer catheter removed due to pt  C/o pain when suprapubic area was palpated  New kramer catheter inserted without difficulty  Continue to monitor kramer status and urine output

## 2018-04-14 NOTE — DISCHARGE INSTRUCTIONS
Vaginal Delivery   WHAT YOU NEED TO KNOW:   A vaginal delivery occurs when your baby is born through your vagina (birth canal)  DISCHARGE INSTRUCTIONS:   Seek care immediately if:   · Your leg feels warm, tender, and painful  It may look swollen and red  · You have a fever  · You are urinating very little, or not at all  · You have heavy vaginal bleeding that fills 1 or more sanitary pads in 1 hour  · You feel weak, dizzy, or faint  Contact your healthcare provider if:   · Your abdominal or perineal pain does not go away, or gets worse  · You feel depressed  · You have questions or concerns about your condition or care  Medicines:  · NSAIDs , such as ibuprofen, help decrease swelling, pain, and fever  This medicine is available with or without a doctor's order  NSAIDs can cause stomach bleeding or kidney problems in certain people  If you take blood thinner medicine, always ask your healthcare provider if NSAIDs are safe for you  Always read the medicine label and follow directions  · Stool softeners  make it easier for you to have a bowel movement  You may need this medicine to treat or prevent constipation  · Take your medicine as directed  Contact your healthcare provider if you think your medicine is not helping or if you have side effects  Tell him or her if you are allergic to any medicine  Keep a list of the medicines, vitamins, and herbs you take  Include the amounts, and when and why you take them  Bring the list or the pill bottles to follow-up visits  Carry your medicine list with you in case of an emergency  Follow up with your healthcare provider:  Most women need to return 6 weeks after a vaginal delivery  Ask your healthcare provider how to care for your wounds or stitches, if you have them  Write down your questions so you remember to ask them during your visits  Activity:  Rest as much as possible  Try to keep all activities short   You may be able to do some exercise soon after you have your baby  Talk with your healthcare provider before you start exercising  If you work outside the home, ask when you can return to your job  Kegel exercises:  Kegel exercises may help your vaginal and rectal muscles heal faster  You can do Kegel exercises by tightening and relaxing the muscles around your vagina  Kegel exercises help make the muscles stronger  Breast care:  When your milk comes in, your breasts may feel full and hard  Ask how to care for your breasts, even if you are not breastfeeding  Constipation:  You may have constipation for a period of time after you have your baby  Do not try to push the bowel movement out if it is too hard  High-fiber foods and extra liquids can help you prevent constipation  Examples of high-fiber foods are fruit and bran  Prune juice and water are good liquids to drink  You may also be told to take over-the-counter fiber and stool softener medicines  Take these items as directed  Ask how to prevent or treat hemorrhoids  Perineum care: Your perineum is the area between your vagina and anus  Keep the area clean and dry  This will help it heal and prevent infection  Wash the area gently with soap and water when you bathe or shower  Rinse your perineum with warm water after you urinate or have a bowel movement  Your healthcare provider may suggest you use a warm sitz bath to help decrease pain  To take a sitz bath, fill a bathtub with 4 to 6 inches of warm water  You may also use a sitz bath pan that fits inside the toilet  Sit in the sitz bath for 20 minutes  Do this 2 to 3 times a day, or as directed  The warm water can help decrease pain and swelling  Vaginal discharge: You will have vaginal discharge, called lochia, after your delivery  The lochia is red or dark brown with clots for 1 to 3 days after the birth  The amount will decrease and turn pale pink or brown for 3 to 10 days  It will turn white or yellow on the 10th or 14th day  Lochia is usually gone within 3 weeks  Use a sanitary pad rather than a tampon to prevent a vaginal infection  You will have lochia for up to 3 weeks after your baby is born  Monthly periods: Your period may start again within 7 to 9 weeks after your baby is born  If you are breastfeeding, it may take longer for your period to start again  You can still get pregnant again even though you do not have your monthly period  Talk with your healthcare provider about a birth control method if you do not want to get pregnant  Mood changes: Many new mothers have some kind of mood changes after delivery  Some of these changes occur because of lack of sleep, hormone changes, and caring for a new baby  Some mood changes can be more serious, such as postpartum depression  Talk with your healthcare provider if you feel unable to care for yourself or your baby  Sexual activity:  Do not have sex until your healthcare provider says it is okay  You may notice you have a decreased desire for sex, or sex may be painful  You may need to use a vaginal lubricant (gel) to help make sex more comfortable  © 2017 [facility] [facility] Information is for End User's use only and may not be sold, redistributed or otherwise used for commercial purposes  All illustrations and images included in CareNotes® are the copyrighted property of A D A M , Inc  or Mauricio Sosa  The above information is an  only  It is not intended as medical advice for individual conditions or treatments  Talk to your doctor, nurse or pharmacist before following any medical regimen to see if it is safe and effective for you

## 2018-04-14 NOTE — OB LABOR/OXYTOCIN SAFETY PROGRESS
Oxytocin Safety Progress Check Note - Hortensia Lebron 26 y o  female MRN: [id number]    Unit/Bed#: -01 Encounter: [id number]    Obstetric History       T1      L1     SAB2   TAB0   Ectopic0   Multiple0   Live Births1    Obstetric Comments   Asthma, elderly multigravida     Gestational Age: 38w9d  Dose (chris-units/min) Oxytocin: 20 chris-units/min  Contraction Frequency (minutes): 1-2  Contraction Quality: Moderate  Tachysystole: No   Dilation: 3        Effacement (%): 80  Station: -1  Baseline Rate: 140 bpm  Fetal Heart Rate: 140 BPM  FHR Category: Category I     Oxytocin Safety Progress Check: Safety check completed    Notes/comments:   Patient feeling some discomfort on left side, IUPC placed will evaluate mvu to titrate pitocin, expectant management            Mai Goodell, MD 2018 4:39 PM

## 2018-04-14 NOTE — ANESTHESIA PREPROCEDURE EVALUATION
Review of Systems/Medical History  Patient summary reviewed  Chart reviewed      Cardiovascular   Pulmonary       GI/Hepatic            Endo/Other    Obesity    GYN  , Prior pregnancy/OB history : 3 Parity: 1,          Hematology   Musculoskeletal       Neurology   Psychology           Physical Exam    Airway    Mallampati score: II  TM Distance: >3 FB  Neck ROM: full     Dental   No notable dental hx     Cardiovascular  Rhythm: regular, Rate: normal, Cardiovascular exam normal    Pulmonary  Pulmonary exam normal Breath sounds clear to auscultation,     Other Findings        Anesthesia Plan  ASA Score- 2     Anesthesia Type- epidural with ASA Monitors  Additional Monitors:   Airway Plan:         Plan Factors-    Induction- intravenous  Postoperative Plan- Plan for postoperative opioid use  Informed Consent- Anesthetic plan and risks discussed with patient  I personally reviewed this patient with the CRNA  Discussed and agreed on the Anesthesia Plan with the CRNA  Shun Echols

## 2018-04-14 NOTE — H&P
H&P Exam - Obstetrics   Christiano Allen 26 y o  female MRN: [id number]  Unit/Bed#: -01 Encounter: [id number]    >2 Midnights    INPATIENT     History of Present Illness   Chief Complaint: Induction of labor    HPI:  Christiano Allen is a 26 y o   female with an ADDISON of 2018, by Last Menstrual Period at 40w6d weeks gestation who is being admitted for IOL for post-dates  Pt was seen in Triage recently for an elevated pressure in  center that was not repeated and was sent to L&D for evaluation  Bps were 130s-140s/60s-80s and pre-eclampsia labs WNL with p/c ratio 0 17  Pt asymptomatic  Contractions: None  Leakage of fluid: None  Bleeding: None  Fetal movement: present  Her current obstetrical history is significant for advanced maternal age, obesity, asthma (last exacerbation in )      Review of Systems   Constitutional: Negative for chills and fever  Eyes: Negative for visual disturbance  Respiratory: Negative for cough, shortness of breath and wheezing  Cardiovascular: Negative for chest pain and palpitations  Gastrointestinal: Positive for nausea  Negative for abdominal pain, constipation, diarrhea and vomiting  Genitourinary: Negative for vaginal bleeding and vaginal discharge  Neurological: Negative for weakness, light-headedness and headaches         Historical Information   OB History    Para Term  AB Living   4 1 1   2 1   SAB TAB Ectopic Multiple Live Births   2       1      # Outcome Date GA Lbr Carter/2nd Weight Sex Delivery Anes PTL Lv   4 Current            3 SAB 16 8w0d          2 Term 11 39w0d  2977 g (6 lb 9 oz) F Vag-Spont  N TAIWO      Complications: Passage of meconium noted during delivery,Oligohydramnios   1 SAB 2010 6w0d             Obstetric Comments   Asthma, elderly multigravida     Baby complications/comments: EFW [id number]  Past Medical History:   Diagnosis Date    Abnormal Pap smear of cervix     Animal dander allergy     Asthma with exacerbation last assessed 2015    Pregnancy     :2010 SAB-natural;  F IOL for oligo-meconium    Seasonal allergies     Spontaneous      last assessed oct 3 2016    Varicella     HX DISEASE    Visual impairment     eyewear      Past Surgical History:   Procedure Laterality Date    MOLE REMOVAL      on stomach     Social History   History   Alcohol Use No     History   Drug Use No     History   Smoking Status    Never Smoker   Smokeless Tobacco    Never Used     Family History: non-contributory    Meds/Allergies   {  Prescriptions Prior to Admission   Medication    albuterol (2 5 mg/3 mL) 0 083 % nebulizer solution    albuterol (PROAIR HFA) 90 mcg/act inhaler    fluticasone-salmeterol (ADVAIR DISKUS) 100-50 mcg/dose    montelukast (SINGULAIR) 10 mg tablet    Prenatal MV-Min-Fe Fum-FA-DHA (VITAFOL-OB+DHA) 65-1 & 250 MG MISC     Allergies   Allergen Reactions    Cat Hair Extract Allergic Rhinitis     Asthma flares up    Banana GI Intolerance    Other Allergic Rhinitis     Seasonal allergies        Objective   Vitals: Temperature 97 7 °F (36 5 °C), temperature source Oral, height 5' 4" (1 626 m), weight 117 kg (258 lb), last menstrual period 2017, currently breastfeeding  Body mass index is 44 29 kg/m²  Invasive Devices          No matching active lines, drains, or airways          Physical Exam   Constitutional: She is oriented to person, place, and time  She appears well-developed and well-nourished  No distress  HENT:   Head: Normocephalic and atraumatic  Cardiovascular: Normal rate, regular rhythm and normal heart sounds  Exam reveals no gallop and no friction rub  No murmur heard  Pulmonary/Chest: Effort normal and breath sounds normal  No respiratory distress  She has no wheezes  She has no rales  Abdominal: Soft  Bowel sounds are normal  There is no tenderness     Obese, Gravid uterus   Genitourinary: Vagina normal    Neurological: She is alert and oriented to person, place, and time  Skin: She is not diaphoretic  Psychiatric: She has a normal mood and affect  Her behavior is normal    Vitals reviewed  Vaginal Exam  Position: Unknown  Membranes: Intact  FHR: Baseline: 125 bpm, Variability: Moderate 6 - 25 bpm, Accelerations: Reactive, Decelerations: Absent and Category 1    [place]: None    Prenatal Labs: I have personally reviewed pertinent reports  , Blood Type:   Lab Results   Component Value Date/Time    ABO Grouping A 2017     , D (Rh type): positive  , Antibody Screen: negative , HCT/HGB:   Lab Results   Component Value Date/Time    Hematocrit 36 5 2018 01:30 PM    Hemoglobin 12 4 2018 01:30 PM      , 1 hour Glucola:   Lab Results   Component Value Date/Time    Glucose 166 2018   , Varicella: positive history    , Rubella: immune     , VDRL/RPR: non-reactive   , Hep B:   Lab Results   Component Value Date/Time    External Hepatitis B Surface Ag NEG 2017     , HIV:   Lab Results   Component Value Date/Time    External HIV-1 Antibody NEG 2017     , Chlamydia:   Lab Results   Component Value Date/Time    External Chlamydia Screen NEG 2017     , Gonorrhea:   Lab Results   Component Value Date/Time    N GONORRHOEAE, AMPLIFIED DNA neg 2016 11:11 AM    External Gonorrhea Screen NEG 2017     , Group B Strep:    Lab Results   Component Value Date/Time    External Strep Group B Ag Negative 2018          Imaging, EKG, Pathology, and Other Studies: I have personally reviewed pertinent reports        Assessment/Plan     Assessment:  IUP at 40w6d for IOL for Post dates  Plan:  1) Admit to L&D  2) CBC, T&S, RPR  3) Epidural upon request  4) Induction with pitocin/AROM  5) Anticipate     D/w Dr Martín Talavera MD  OBGYN, PGY-1  2018 8:04 AM

## 2018-04-15 LAB
BASE EXCESS BLDCOA CALC-SCNC: -6.7 MMOL/L (ref 3–11)
BASE EXCESS BLDCOV CALC-SCNC: -7.3 MMOL/L (ref 1–9)
HCO3 BLDCOA-SCNC: 20.7 MMOL/L (ref 17.3–27.3)
HCO3 BLDCOV-SCNC: 19.9 MMOL/L (ref 12.2–28.6)
O2 CT VFR BLDCOA CALC: 7.2 ML/DL
OXYHGB MFR BLDCOA: 33.8 %
OXYHGB MFR BLDCOV: 44.8 %
PCO2 BLDCOA: 48 MM[HG] (ref 30–60)
PCO2 BLDCOV: 46.1 MM HG (ref 27–43)
PH BLDCOA: 7.25 [PH] (ref 7.23–7.43)
PH BLDCOV: 7.25 [PH] (ref 7.19–7.49)
PO2 BLDCOA: 19.8 MM HG (ref 5–25)
PO2 BLDCOV: 22.2 MM HG (ref 15–45)
SAO2 % BLDCOV: 9.7 ML/DL

## 2018-04-15 PROCEDURE — 4A1H7CZ MONITORING OF PRODUCTS OF CONCEPTION, CARDIAC RATE, VIA NATURAL OR ARTIFICIAL OPENING: ICD-10-PCS | Performed by: OBSTETRICS & GYNECOLOGY

## 2018-04-15 PROCEDURE — 4A1HXCZ MONITORING OF PRODUCTS OF CONCEPTION, CARDIAC RATE, EXTERNAL APPROACH: ICD-10-PCS | Performed by: OBSTETRICS & GYNECOLOGY

## 2018-04-15 PROCEDURE — 10D17Z9 MANUAL EXTRACTION OF PRODUCTS OF CONCEPTION, RETAINED, VIA NATURAL OR ARTIFICIAL OPENING: ICD-10-PCS | Performed by: OBSTETRICS & GYNECOLOGY

## 2018-04-15 PROCEDURE — 0KQM0ZZ REPAIR PERINEUM MUSCLE, OPEN APPROACH: ICD-10-PCS | Performed by: OBSTETRICS & GYNECOLOGY

## 2018-04-15 PROCEDURE — 82805 BLOOD GASES W/O2 SATURATION: CPT | Performed by: OBSTETRICS & GYNECOLOGY

## 2018-04-15 PROCEDURE — 10H073Z INSERTION OF MONITORING ELECTRODE INTO PRODUCTS OF CONCEPTION, VIA NATURAL OR ARTIFICIAL OPENING: ICD-10-PCS | Performed by: OBSTETRICS & GYNECOLOGY

## 2018-04-15 PROCEDURE — 10907ZC DRAINAGE OF AMNIOTIC FLUID, THERAPEUTIC FROM PRODUCTS OF CONCEPTION, VIA NATURAL OR ARTIFICIAL OPENING: ICD-10-PCS | Performed by: OBSTETRICS & GYNECOLOGY

## 2018-04-15 PROCEDURE — 94762 N-INVAS EAR/PLS OXIMTRY CONT: CPT

## 2018-04-15 PROCEDURE — 3E033VJ INTRODUCTION OF OTHER HORMONE INTO PERIPHERAL VEIN, PERCUTANEOUS APPROACH: ICD-10-PCS | Performed by: OBSTETRICS & GYNECOLOGY

## 2018-04-15 RX ORDER — OXYCODONE HYDROCHLORIDE AND ACETAMINOPHEN 5; 325 MG/1; MG/1
2 TABLET ORAL EVERY 4 HOURS PRN
Status: DISCONTINUED | OUTPATIENT
Start: 2018-04-15 | End: 2018-04-17 | Stop reason: HOSPADM

## 2018-04-15 RX ORDER — CALCIUM CARBONATE 200(500)MG
1000 TABLET,CHEWABLE ORAL DAILY PRN
Status: DISCONTINUED | OUTPATIENT
Start: 2018-04-15 | End: 2018-04-17 | Stop reason: HOSPADM

## 2018-04-15 RX ORDER — ACETAMINOPHEN 325 MG/1
650 TABLET ORAL EVERY 6 HOURS PRN
Status: DISCONTINUED | OUTPATIENT
Start: 2018-04-15 | End: 2018-04-17 | Stop reason: HOSPADM

## 2018-04-15 RX ORDER — DOCUSATE SODIUM 100 MG/1
100 CAPSULE, LIQUID FILLED ORAL 2 TIMES DAILY
Status: DISCONTINUED | OUTPATIENT
Start: 2018-04-15 | End: 2018-04-17 | Stop reason: HOSPADM

## 2018-04-15 RX ORDER — OXYTOCIN/RINGER'S LACTATE 30/500 ML
250 PLASTIC BAG, INJECTION (ML) INTRAVENOUS CONTINUOUS
Status: DISCONTINUED | OUTPATIENT
Start: 2018-04-15 | End: 2018-04-17 | Stop reason: HOSPADM

## 2018-04-15 RX ORDER — LIDOCAINE HYDROCHLORIDE 10 MG/ML
INJECTION, SOLUTION EPIDURAL; INFILTRATION; INTRACAUDAL; PERINEURAL
Status: DISPENSED
Start: 2018-04-15 | End: 2018-04-15

## 2018-04-15 RX ORDER — DIAPER,BRIEF,INFANT-TODD,DISP
1 EACH MISCELLANEOUS AS NEEDED
Status: DISCONTINUED | OUTPATIENT
Start: 2018-04-15 | End: 2018-04-17 | Stop reason: HOSPADM

## 2018-04-15 RX ORDER — ONDANSETRON 2 MG/ML
4 INJECTION INTRAMUSCULAR; INTRAVENOUS EVERY 8 HOURS PRN
Status: DISCONTINUED | OUTPATIENT
Start: 2018-04-15 | End: 2018-04-17 | Stop reason: HOSPADM

## 2018-04-15 RX ORDER — OXYCODONE HYDROCHLORIDE AND ACETAMINOPHEN 5; 325 MG/1; MG/1
1 TABLET ORAL EVERY 4 HOURS PRN
Status: DISCONTINUED | OUTPATIENT
Start: 2018-04-15 | End: 2018-04-17 | Stop reason: HOSPADM

## 2018-04-15 RX ORDER — IBUPROFEN 600 MG/1
600 TABLET ORAL EVERY 6 HOURS PRN
Status: DISCONTINUED | OUTPATIENT
Start: 2018-04-15 | End: 2018-04-17 | Stop reason: HOSPADM

## 2018-04-15 RX ORDER — DIPHENHYDRAMINE HCL 25 MG
25 TABLET ORAL EVERY 6 HOURS PRN
Status: DISCONTINUED | OUTPATIENT
Start: 2018-04-15 | End: 2018-04-17 | Stop reason: HOSPADM

## 2018-04-15 RX ORDER — OXYTOCIN/RINGER'S LACTATE 30/500 ML
62.5 PLASTIC BAG, INJECTION (ML) INTRAVENOUS CONTINUOUS
Status: DISCONTINUED | OUTPATIENT
Start: 2018-04-15 | End: 2018-04-15 | Stop reason: SDUPTHER

## 2018-04-15 RX ADMIN — OXYCODONE HYDROCHLORIDE AND ACETAMINOPHEN 1 TABLET: 5; 325 TABLET ORAL at 09:00

## 2018-04-15 RX ADMIN — DOCUSATE SODIUM 100 MG: 100 CAPSULE, LIQUID FILLED ORAL at 09:00

## 2018-04-15 RX ADMIN — DOCUSATE SODIUM 100 MG: 100 CAPSULE, LIQUID FILLED ORAL at 20:52

## 2018-04-15 RX ADMIN — IBUPROFEN 600 MG: 600 TABLET ORAL at 04:37

## 2018-04-15 RX ADMIN — Medication 62.5 MILLI-UNITS/MIN: at 02:54

## 2018-04-15 RX ADMIN — HYDROCORTISONE 1 APPLICATION: 1 CREAM TOPICAL at 03:43

## 2018-04-15 RX ADMIN — BENZOCAINE AND LEVOMENTHOL: 200; 5 SPRAY TOPICAL at 03:43

## 2018-04-15 RX ADMIN — WITCH HAZEL 1 PAD: 500 SOLUTION RECTAL; TOPICAL at 03:44

## 2018-04-15 RX ADMIN — OXYCODONE HYDROCHLORIDE AND ACETAMINOPHEN 2 TABLET: 5; 325 TABLET ORAL at 12:55

## 2018-04-15 RX ADMIN — OXYCODONE HYDROCHLORIDE AND ACETAMINOPHEN 2 TABLET: 5; 325 TABLET ORAL at 20:51

## 2018-04-15 NOTE — LACTATION NOTE
This note was copied from a baby's chart  Mom states infant still has not breastfeed well  Set up to start pumping  Reviewed technique, frequency, equipment cleaning  Mom states she will start in a while  Encouraged to call for assistance as needed  Discussed finger feeding obtained colostrum or formula and avoidance of bottle nipples

## 2018-04-15 NOTE — OB LABOR/OXYTOCIN SAFETY PROGRESS
Oxytocin Safety Progress Check Note - Jemma You 26 y o  female MRN: [id number]  Huddle Note  Unit/Bed#: -01 Encounter: [id number]    Obstetric History       T1      L1     SAB2   TAB0   Ectopic0   Multiple0   Live Births1    Obstetric Comments   Asthma, elderly multigravida     Gestational Age: 38w9d  Dose (chris-units/min) Oxytocin: 0 chris-units/min  Contraction Frequency (minutes): 2  Contraction Quality: Strong  Tachysystole: No   Dilation: 6        Effacement (%): 80  Station: 0  Baseline Rate: 145 bpm  Fetal Heart Rate: 125 BPM  FHR Category: Category II     Oxytocin Safety Progress Check: Safety check completed    Notes/comments:   More prolonged deceleration  With variables and lates, resolved with pit off, position change, will also amnioinfuse  Resident nurse and charge nurse present and agree with plan  Will leave oxytocin off for now and observe closely    Rosie Sharma MD 2018 9:05 PM

## 2018-04-15 NOTE — ANESTHESIA PROCEDURE NOTES
Epidural Block    Patient location during procedure: OB  Start time: 4/14/2018 6:38 PM  Reason for block: procedure for pain, at surgeon's request, post-op pain management and primary anesthetic  Staffing  Anesthesiologist: Virgie Scanlon  Performed: anesthesiologist   Preanesthetic Checklist  Completed: patient identified, site marked, surgical consent, pre-op evaluation, timeout performed, IV checked, risks and benefits discussed and monitors and equipment checked  Epidural  Patient position: sitting  Prep: Betadine  Patient monitoring: heart rate, cardiac monitor, continuous pulse ox and frequent blood pressure checks  Approach: midline  Location: lumbar (1-5)  Injection technique: ZELDA air  Needle  Needle type: Tuohy   Needle gauge: 18 G  Catheter type: end hole  Catheter size: 20 G  Catheter at skin depth: 11 cm  Test dose: negative and lidocaine 1 5% with epinephrine 1-to-200,000  Assessment  Sensory level: [id number] aspiration for CSF, negative aspiration for heme and no paresthesia on injection  patient tolerated the procedure well with no immediate complications  Additional Notes  CSE performed after ZELDA clear CSF   [place] Bupi 0 25% with clonidine 250mcg immediately relieved pain

## 2018-04-15 NOTE — OB LABOR/OXYTOCIN SAFETY PROGRESS
Oxytocin Safety Progress Check Note - Mario Garrett 26 y o  female MRN: [id number]    Unit/Bed#: -01 Encounter: [id number]    Obstetric History       T1      L1     SAB2   TAB0   Ectopic0   Multiple0   Live Births1    Obstetric Comments   Asthma, elderly multigravida     Gestational Age: 41w0d  Dose (chris-units/min) Oxytocin: 8 chris-units/min  Contraction Frequency (minutes): 2 5-3 5  Contraction Quality: Strong  Tachysystole: No   Dilation: 9        Effacement (%): 100  Station: 0  Baseline Rate: 125 bpm  Fetal Heart Rate: 125 BPM  FHR Category: Category I     Oxytocin Safety Progress Check: Safety check completed    Notes/comments:   Patient feeling pressure  Expectant management  FHT reactive and reassuring some early deceleration      Lucio Mejia MD 4/15/2018 12:11 AM

## 2018-04-15 NOTE — OB LABOR/OXYTOCIN SAFETY PROGRESS
Oxytocin Safety Progress Check Note - Ken Clock 26 y o  female MRN: [id number]    Unit/Bed#: -01 Encounter: [id number]    Obstetric History       T1      L1     SAB2   TAB0   Ectopic0   Multiple0   Live Births1    Obstetric Comments   Asthma, elderly multigravida     Gestational Age: 38w9d  Dose (chris-units/min) Oxytocin: 12 chris-units/min  Contraction Frequency (minutes): 2-2 5  Contraction Quality: Moderate  Tachysystole: No   Dilation: 5        Effacement (%): 80  Station: -1  Baseline Rate: 125 bpm  Fetal Heart Rate: 125 BPM   Fetal Heart Rate Category 2    Notes/comments:   Patient developed some late decelerations which resolved with position change and O2 along with fluid bolus, Patient is now comfortable  Expectant management      Raffaele Pressley MD 2018 8:37 PM

## 2018-04-15 NOTE — OB LABOR/OXYTOCIN SAFETY PROGRESS
Oxytocin Safety Progress Check Note - [place] 26 y o  female MRN: [id number]    Unit/Bed#: -01 Encounter: [id number]    Obstetric History       T1      L1     SAB2   TAB0   Ectopic0   Multiple0   Live Births1    Obstetric Comments   Asthma, elderly multigravida     Gestational Age: 38w9d  Dose (chris-units/min) Oxytocin: 6 chris-units/min  Contraction Frequency (minutes): 3-3 5  Contraction Quality: Moderate  Tachysystole: No   Dilation: 6        Effacement (%): 90  Station: 0  Baseline Rate: 125 bpm  Fetal Heart Rate: 125 BPM  FHR Category: Category I     Oxytocin Safety Progress Check: Safety check completed    Notes/comments:   Pt feeling more pressure  SVE as above  MVUs 195   FHT category I, continue pitocin titration until adequate MVUs  Provider Notified: Yes  Provider Name: Dr Jak Hawkins MD 2018 11:32 PM

## 2018-04-15 NOTE — ANESTHESIA POSTPROCEDURE EVALUATION
Post-Op Assessment Note      CV Status:  Stable    Mental Status:  Awake    Hydration Status:  Stable    PONV Controlled:  None    Airway Patency:  Patent    Post Op Vitals Reviewed: Yes          Staff: Anesthesiologist     Post-op block assessment: no complications and catheter intact        /59 (04/15/18 0300)    Temp 98 7 °F (37 1 °C) (04/15/18 0300)    Pulse 78 (04/15/18 0300)   Resp 18 (04/15/18 0300)    SpO2

## 2018-04-15 NOTE — L&D DELIVERY NOTE
Delivery Note    Obstetrician:   Roby Castro    Assistant: none    Pre-Delivery Diagnosis: Induced labor, Single fetus, and post term     Post-Delivery Diagnosis: Same as above - Delivered or 2nd degree laceration    Procedure: Repair second degree spontaneous laceration, SAVD, manual extraction of placenta     Episiotomy: none    Laceration: 2nd degree    Estimated Blood Loss:  926           Complications:  none    Details: Patient delivered a viable Female  over second degree laceration  After delivery of the  and appropriate delay, the umbilical cord was doubly clamped and cut and the  was passed to staff for routine care  Umbilical cord blood and umbilical artery and venous gases were collected  Active management of the third stage of labor was undertaken with IV pitocin  Bleeding was noted to be under control  Avulsion of distal cord occurred and manual extraction was performed  Placenta delivered intact with 3-v cord and trailing membranes  Inspection of the perineum revealed the above which was then repaired in standard fashion with 2-0 Vicryl rapid  The lacerations showed good tissue reapproximation and hemostasis  Mother and baby are currently recovering nicely in stable condition             Attending Attestation: I was present for the entire procedure

## 2018-04-15 NOTE — PROGRESS NOTES
Called by nurse to evaluate clots expressed  Uterus noted to be firm at U, no active bleeding or additional clots expressed  Extra bag of pit running  Will continue to monitor       Kieran Combs MD  OBGYN, PGY-1  4/15/2018 3:33 AM

## 2018-04-15 NOTE — LACTATION NOTE
This note was copied from a baby's chart  Infant assisted to breast in cradle hold  infant awake but not really rooting  Some attempts made with encouragement, but no real latch  Feeding plan discussed with parents  Will set up to start pumping  Mom confident and works well with infant

## 2018-04-15 NOTE — LACTATION NOTE
This note was copied from a baby's chart  Mom states she is experienced with breastfeeding and this infant has not yet latched on  Discussed normal  infant feeding patterns  In the first few days  Given admission breastfeeding pkat  Encouraged to watch for feeding cues  To call for assistance when infant is cueing

## 2018-04-16 LAB — RPR SER QL: NORMAL

## 2018-04-16 PROCEDURE — 99024 POSTOP FOLLOW-UP VISIT: CPT | Performed by: OBSTETRICS & GYNECOLOGY

## 2018-04-16 RX ADMIN — OXYCODONE HYDROCHLORIDE AND ACETAMINOPHEN 2 TABLET: 5; 325 TABLET ORAL at 08:42

## 2018-04-16 RX ADMIN — Medication 1 TABLET: at 08:42

## 2018-04-16 RX ADMIN — OXYCODONE HYDROCHLORIDE AND ACETAMINOPHEN 2 TABLET: 5; 325 TABLET ORAL at 20:51

## 2018-04-16 RX ADMIN — IBUPROFEN 600 MG: 600 TABLET ORAL at 04:53

## 2018-04-16 RX ADMIN — IBUPROFEN 600 MG: 600 TABLET ORAL at 16:33

## 2018-04-16 RX ADMIN — DOCUSATE SODIUM 100 MG: 100 CAPSULE, LIQUID FILLED ORAL at 16:33

## 2018-04-16 RX ADMIN — DOCUSATE SODIUM 100 MG: 100 CAPSULE, LIQUID FILLED ORAL at 08:42

## 2018-04-16 RX ADMIN — OXYCODONE HYDROCHLORIDE AND ACETAMINOPHEN 2 TABLET: 5; 325 TABLET ORAL at 12:36

## 2018-04-16 NOTE — PROGRESS NOTES
Progress Note - OB/GYN  Post-Partum Physician Note   Floyce Curling 26 y o  female MRN: [id number]  Unit/Bed#:  314-01 Encounter: [id number]    Subjective/Objective   Chief Complaint: Postpartum    Subjective: Pain is controlled with current analgesics  Medications being used: ibuprofen (OTC)  Eating a regular diet without difficulty  Flatus Yes   Bowel movements are not yet  Voiding without difficulty  Ambulating Yes  Breastfeeding Yes pumping Lochia Lochia  Lochia Color: Rubra  Amount: Minimal  Lochia Odor: None  Clots: None normal     Objective:    Vitals: Blood pressure 150/73, pulse 88, temperature 98 5 °F (36 9 °C), temperature source Oral, resp  rate 18, height 5' 4" (1 626 m), weight 117 kg (258 lb), last menstrual period 07/02/2017, SpO2 98 %, currently breastfeeding  Intake/Output Summary (Last 24 hours) at 04/16/18 0840  Last data filed at 04/15/18 1201   Gross per 24 hour   Intake                0 ml   Output              875 ml   Net             -875 ml       Physical Exam:  GEN: Floyce Curling appears well, alert and oriented x 3, pleasant and cooperative    HEART: regular rhythm, normal S1 and S2, no murmurs, clicks, gallops or rubs   LUNGS: clear to auscultation bilaterally; no wheezes, rales, or rhonchi   ABDOMEN: normal bowel sounds, soft, no tenderness, no distention  : Uterus firm at umbilicus nontender u-1  EXTREMITIES: peripheral pulses normal; no clubbing, cyanosis, or edema      Labs: No results found for this or any previous visit (from the past 24 hour(s))          MEDS:   Current Facility-Administered Medications   Medication Dose Route Frequency    acetaminophen (TYLENOL) tablet 650 mg  650 mg Oral Q6H PRN    albuterol (PROVENTIL HFA,VENTOLIN HFA) inhaler 1 puff  1 puff Inhalation Q4H PRN    benzocaine-menthol-lanolin-aloe (DERMOPLAST) 20-0 5 % topical spray   Topical 4x Daily PRN    calcium carbonate (TUMS) chewable tablet 1,000 mg  1,000 mg Oral Daily PRN    diphenhydrAMINE (BENADRYL) tablet 25 mg  25 mg Oral Q6H PRN    docusate sodium (COLACE) capsule 100 mg  100 mg Oral BID    fluticasone-salmeterol (ADVAIR) 100-50 mcg/dose inhaler 1 puff  1 puff Inhalation BID PRN    hydrocortisone 1 % cream 1 application  1 application Topical PRN    ibuprofen (MOTRIN) tablet 600 mg  600 mg Oral Q6H PRN    montelukast (SINGULAIR) tablet 10 mg  10 mg Oral Daily PRN    ondansetron (ZOFRAN) injection 4 mg  4 mg Intravenous Q8H PRN    oxyCODONE-acetaminophen (PERCOCET) 5-325 mg per tablet 1 tablet  1 tablet Oral Q4H PRN    oxyCODONE-acetaminophen (PERCOCET) 5-325 mg per tablet 2 tablet  2 tablet Oral Q4H PRN    oxytocin (PITOCIN) 30 Units in lactated ringers 500 mL infusion  250 chris-units/min Intravenous Continuous    prenatal multivitamin tablet 1 tablet  1 tablet Oral Daily    witch hazel-glycerin (TUCKS) topical pad 1 pad  1 pad Topical PRN     Invasive Devices     Peripheral Intravenous Line            Peripheral IV 18 Left Forearm 2 days          Intrauterine Pressure Catheter            Intrauterine Pressure Catheter 18 1636 1 day                  Assessment/Plan     Assessment:  Patient Active Problem List   Diagnosis    Asthma    Elderly multigravida in third trimester    Prenatal care, subsequent pregnancy, third trimester    Maternal morbid obesity, antepartum, third trimester ([facility] [address] )    [facility] 40 0-44 9, [facility]    40 weeks gestation of pregnancy     (spontaneous vaginal delivery)       Plan:  1) Postpartum day #  1 status post spontaneous vaginal delivery  2) Continue routine postpartum care        Rosie Sharma MD  2018  8:40 AM

## 2018-04-16 NOTE — PROGRESS NOTES
POSTPARTUM NOTE  Hortensia Lebron 26 y o  female MRN: [id number]  Unit/Bed#: -01 Encounter: [id number]    Date: 04/16/18  Procedure: Spontaneous Vaginal Delivery with repair of second degree lac, and manual extraction of placenta  Postpartum/Postop Day #: 1     SUBJECTIVE:  Pain: yes- mild   Tolerating Oral Intake: yes   Voiding: yes  Flatus: yes  Bowel Movement: no  Ambulating: yes  Breastfeeding: yes - pumping  Chest Pain: no  Shortness of Breath: no  Leg Pain/Discomfort: no  Lochia: moderate      OBJECTIVE:   Vitals: Temp:  [97 7 °F (36 5 °C)-98 5 °F (36 9 °C)] 98 5 °F (36 9 °C)  HR:  [] 88  Resp:  [18-20] 18  BP: ()/(50-85) 150/73  General: No Acute Distress, alert  Cardiovascular: RRR  Lungs: Clear to Auscultation Bilaterally, no wheezing, non-labored breathing   Abdomen: Soft, non-distended, non-tender, no rebound, no guarding   Fundus: Firm & Non-Tender, Fundal Location:   -1 cm below the umbilicus  Lower Extremities: Non-tender, no peripheral Edema       LABS / TESTS / MEDICATION:    Recent Results (from the past 72 hour(s))   CBC and differential    Collection Time: 04/14/18  8:18 AM   Result Value Ref Range    WBC 11 04 (H) 4 31 - 10 16 Thousand/uL    RBC 4 35 3 81 - 5 12 Million/uL    Hemoglobin 12 8 11 5 - 15 4 g/dL    Hematocrit 36 6 34 8 - 46 1 %    MCV 84 82 - 98 fL    MCH 29 4 26 8 - 34 3 pg    MCHC 35 0 31 4 - 37 4 g/dL    RDW 14 0 11 6 - 15 1 %    MPV 10 4 8 9 - 12 7 fL    Platelets 319 399 - 080 Thousands/uL    nRBC 0 /100 WBCs    Neutrophils Relative 76 (H) 43 - 75 %    Lymphocytes Relative 15 14 - 44 %    Monocytes Relative 7 4 - 12 %    Eosinophils Relative 2 0 - 6 %    Basophils Relative 0 0 - 1 %    Neutrophils Absolute 8 28 (H) 1 85 - 7 62 Thousands/µL    Lymphocytes Absolute 1 70 0 60 - 4 47 Thousands/µL    Monocytes Absolute 0 78 0 17 - 1 22 Thousand/µL    Eosinophils Absolute 0 19 0 00 - 0 61 Thousand/µL    Basophils Absolute 0 02 0 00 - 0 10 Thousands/µL   Type and screen Collection Time: 04/14/18  8:18 AM   Result Value Ref Range    ABO Grouping A     Rh Factor Positive     Antibody Screen Negative     Specimen Expiration Date [id number]    Blood gas, arterial, cord    Collection Time: 04/15/18  1:31 AM   Result Value Ref Range    pH, Cord Art 7 252 7 230 - 7 430    pCO2, Cord Art 48 0 30 0 - 60 0    pO2, Cord Art 19 8 5 0 - 25 0 mm HG    HCO3, Cord Art 20 7 17 3 - 27 3 mmol/L    Base Exc, Cord Art -6 7 (L) 3 0 - 11 0 mmol/L    O2 Content, Cord Art 7 2 ml/dl    O2 Hgb, Arterial Cord 33 8 %   Blood gas, venous, cord    Collection Time: 04/15/18  1:31 AM   Result Value Ref Range    pH, Cord Bart 7 252 7 190 - 7 490    pCO2, Cord Bart 46 1 (H) 27 0 - 43 0 mm HG    pO2, Cord Bart 22 2 15 0 - 45 0 mm HG    HCO3, Cord Bart 19 9 12 2 - 28 6 mmol/L    Base Exc, Cord Bart -7 3 (L) 1 0 - 9 0 mmol/L    O2 Cont, Cord Bart 9 7 mL/dL    O2 HGB,VENOUS CORD 44 8 %         docusate sodium 100 mg Oral BID   prenatal multivitamin 1 tablet Oral Daily       acetaminophen 650 mg Q6H PRN   albuterol 1 puff Q4H PRN   benzocaine-menthol-lanolin-aloe  4x Daily PRN   calcium carbonate 1,000 mg Daily PRN   diphenhydrAMINE 25 mg Q6H PRN   fluticasone-salmeterol 1 puff BID PRN   hydrocortisone 1 application PRN   ibuprofen 600 mg Q6H PRN   montelukast 10 mg Daily PRN   ondansetron 4 mg Q8H PRN   oxyCODONE-acetaminophen 1 tablet Q4H PRN   oxyCODONE-acetaminophen 2 tablet Q4H PRN   witch hazel-glycerin 1 pad PRN       ASSESSMENT:   26 y o  [id number] s/p Spontaneous Vaginal Delivery with repair of second degree lac, and manual extraction of placenta Postpartum day  1    PLAN:  - Delivery: Continue routine postpartum care, encourage ambulation, advance diet as tolerated  - anticipate dc tomorrow      Signature / Title: Shannan Ortez, Family Medicine  Date: 4/16/2018  Time: 8:34 AM

## 2018-04-17 VITALS
BODY MASS INDEX: 44.05 KG/M2 | OXYGEN SATURATION: 99 % | TEMPERATURE: 98 F | RESPIRATION RATE: 18 BRPM | SYSTOLIC BLOOD PRESSURE: 108 MMHG | HEART RATE: 86 BPM | WEIGHT: 258 LBS | HEIGHT: 64 IN | DIASTOLIC BLOOD PRESSURE: 66 MMHG

## 2018-04-17 PROCEDURE — 99024 POSTOP FOLLOW-UP VISIT: CPT | Performed by: OBSTETRICS & GYNECOLOGY

## 2018-04-17 RX ORDER — ACETAMINOPHEN 325 MG/1
TABLET ORAL
Qty: 30 TABLET | Refills: 0
Start: 2018-04-17 | End: 2018-09-04

## 2018-04-17 RX ORDER — IBUPROFEN 600 MG/1
600 TABLET ORAL EVERY 6 HOURS PRN
Qty: 30 TABLET | Refills: 0
Start: 2018-04-17 | End: 2018-09-04

## 2018-04-17 RX ORDER — DIAPER,BRIEF,INFANT-TODD,DISP
1 EACH MISCELLANEOUS AS NEEDED
Qty: 30 G | Refills: 0
Start: 2018-04-17 | End: 2018-09-04

## 2018-04-17 RX ADMIN — WITCH HAZEL 1 PAD: 500 SOLUTION RECTAL; TOPICAL at 08:57

## 2018-04-17 RX ADMIN — Medication 1 TABLET: at 08:49

## 2018-04-17 RX ADMIN — IBUPROFEN 600 MG: 600 TABLET ORAL at 11:13

## 2018-04-17 RX ADMIN — OXYCODONE HYDROCHLORIDE AND ACETAMINOPHEN 2 TABLET: 5; 325 TABLET ORAL at 05:59

## 2018-04-17 RX ADMIN — DOCUSATE SODIUM 100 MG: 100 CAPSULE, LIQUID FILLED ORAL at 08:49

## 2018-04-17 NOTE — PROGRESS NOTES
Progress Note - OB/GYN   [facility] 26 y o  female MRN: [id number]  Unit/Bed#: -01 Encounter: [id number]    Assessment:  26 y o  [id number] s/p  PPD #2  Asthma: Stable  Patient recovering well, Stable  Anticipate discharge today, 2018    Plan:  Continue routine post partum care  Pain management PRN  Encourage ambulation  Encourage breastfeeding    Subjective/Objective   Chief Complaint:    Postpartum state    Subjective:   Patient doing well without complaint this AM- looking forward to going home today    Pain: yes, cramping, improved with meds  Tolerating PO: yes  Voiding: yes  Flatus: yes  BM: no  Ambulating: yes  Breastfeeding:  yes  Chest pain: no  Shortness of breath: no  Leg pain: no  Lochia: minimal    Objective:     Vitals: Temp:  [98 °F (36 7 °C)-98 8 °F (37 1 °C)] 98 °F (36 7 °C)  HR:  [86-90] 86  Resp:  [18] 18  BP: (101-150)/(54-73) 108/66     Physical Exam:   General: NAD, alert, oriented  Cardio: Regular rate and rhythm, no murmur  Resp: nonlabored breathing, clear to auscultation bilaterally  Abdomen: Soft, no distension/rebound/guarding/tenderness   Fundus: Firm, non-tender, fundus: difficult 2/2 body habitus  G/U: minimal lochia noted on pad  Lower Extremities: Non-tender, no palpable cords    Medications:  Current Facility-Administered Medications   Medication Dose Route Frequency    acetaminophen (TYLENOL) tablet 650 mg  650 mg Oral Q6H PRN    albuterol (PROVENTIL HFA,VENTOLIN HFA) inhaler 1 puff  1 puff Inhalation Q4H PRN    benzocaine-menthol-lanolin-aloe (DERMOPLAST) 20-0 5 % topical spray   Topical 4x Daily PRN    calcium carbonate (TUMS) chewable tablet 1,000 mg  1,000 mg Oral Daily PRN    diphenhydrAMINE (BENADRYL) tablet 25 mg  25 mg Oral Q6H PRN    docusate sodium (COLACE) capsule 100 mg  100 mg Oral BID    fluticasone-salmeterol (ADVAIR) 100-50 mcg/dose inhaler 1 puff  1 puff Inhalation BID PRN    hydrocortisone 1 % cream 1 application  1 application Topical PRN    ibuprofen (MOTRIN) tablet 600 mg  600 mg Oral Q6H PRN    montelukast (SINGULAIR) tablet 10 mg  10 mg Oral Daily PRN    ondansetron (ZOFRAN) injection 4 mg  4 mg Intravenous Q8H PRN    oxyCODONE-acetaminophen (PERCOCET) 5-325 mg per tablet 1 tablet  1 tablet Oral Q4H PRN    oxyCODONE-acetaminophen (PERCOCET) 5-325 mg per tablet 2 tablet  2 tablet Oral Q4H PRN    oxytocin (PITOCIN) 30 Units in lactated ringers 500 mL infusion  250 chris-units/min Intravenous Continuous    prenatal multivitamin tablet 1 tablet  1 tablet Oral Daily    witch hazel-glycerin (TUCKS) topical pad 1 pad  1 pad Topical PRN       Carley Notice  4/17/2018  8:15 AM

## 2018-04-17 NOTE — LACTATION NOTE
This note was copied from a baby's chart  Encouraged more frequent feedings by offering less volume of formula supplementation as Ginette Marquez requested as feeding option for her infant  Met with mother to go over feeding log since birth for the first week  Emphasized 8 or more (12) feedings in a 24 hour period, what to expect for the number of diapers per day of life and the progression of properties of the  stooling pattern  Discussed s/s that breastfeeding is going well after day 4 and when to get help from a pediatrician or lactation support person after day 4  Booklet included Breast Pumping Instructions, When You Go Back to Work or School, and Breastfeeding Resources for after discharge including access to the number for the [employer]  Discussed s/s engorgement and how to manage with medications and cool compresses as well as s/s mastitis and when to contact physician  Discussed risks for early supplementation: over feeding, longer digestion times, engorgement for mom, lower milk supply for mom, and nipple confusion  Benefits of breast feeding for infant's intestinal tract, less engorgement for mom, protection from multiple disease processes as infant develops, avoidance of over feeding for infant, less nipple confusion, and increased health benefits for mom  [employer]cor[employer] MOB and FOB to call for assistance, questions and concerns  Extension number for inpatient lactation support provided

## 2018-04-17 NOTE — SOCIAL WORK
Breast pump consult  Per pt request, Medela pump ordered from [facility] via [facility] for d/c today by 2pm  No other CM needs noted

## 2018-04-17 NOTE — PLAN OF CARE
DISCHARGE PLANNING     Discharge to home or other facility with appropriate resources Completed        INFECTION - ADULT     Absence or prevention of progression during hospitalization Completed     Absence of fever/infection during neutropenic period Completed        Knowledge Deficit     Verbalizes understanding of labor plan Completed     Patient/family/caregiver demonstrates understanding of disease process, treatment plan, medications, and discharge instructions Completed        PAIN - ADULT     Verbalizes/displays adequate comfort level or baseline comfort level Completed        SAFETY ADULT     Patient will remain free of falls Completed     Maintain or return to baseline ADL function Completed     Maintain or return mobility status to optimal level Completed

## 2018-04-18 ENCOUNTER — TELEPHONE (OUTPATIENT)
Dept: OBGYN CLINIC | Facility: CLINIC | Age: 33
End: 2018-04-18

## 2018-04-19 NOTE — CASE MANAGEMENT
Notification of Maternity Inpatient Admission/Maternity Inpatient Authorization Request  This is a Notification of Maternity Inpatient Admission/Maternity Inpatient Authorization Request to our facility [facility]  Please be advised that this patient is currently in our facility under Inpatient Status  Below you will find the Birth/ Summary, Attending Physician and Facilitys information including NPI# and contact for the Utilization  assigned to the [facility] where the patient is receiving services  Please feel free to contact the Utilization Review Department with any questions  Mothers Information:  Jason Hurst  MRN: [id number]  YOB: 1985  Admission Date: 2018  7:09 AM  Discharge Date: 2018  2:00 PM  Disposition: Home/Self Care  Admitting Diagnosis: Encounter for full-term uncomplicated delivery [P22]   Information:  Estimated Date of Delivery: 18  Information for the patient's :  Bryan Gan [[id number]]      Delivery Information:  Sex: female  Delivered 4/15/2018 1:27 AM by Vaginal, Spontaneous Delivery; Gestational Age: 37w0d     Measurements:  Weight: 7 lb 8 6 oz (3419 g); Height: 20"    APGAR 1 minute 5 minutes 10 minutes   Totals: 9 9      OB History      Para Term  AB Living    4 2 2   2 2    SAB TAB Ectopic Multiple Live Births    2     0 2        Obstetric Comments    Asthma, elderly multigravida        Attending Physician:  BARRON Keating    Specialty- Obstetrics and Gynecology  [facility] ID- [id number]  [address] [place] [address], [address] [place] [address]  Phone 1: [phone]  Fax: [phone] [facility])  [address] [place] [address]  [phone]  Tax ID: [id number]  NPI: [id number]    [facility] in the [facility] by [address] for 2017  Network Utilization Review Department  Phone: [phone]; Fax [phone]  ATTENTION: The Network Utilization Review Department is now centralized for our 7 Facilities  Make a note that we have a new phone and fax numbers for our Department  Please call with any questions or concerns to [phone] and carefully follow the prompts so that you are directed to the right person  All voicemails are confidential  Fax any determinations, approvals, denials, and requests for initial or continue stay review clinical to [phone]  Due to HIGH CALL volume, it would be easier if you could please send faxed requests to expedite your requests and in part, help us provide discharge notifications faster

## 2018-04-23 RX ORDER — VITAMIN A, ASCORBIC ACID, VITAMIN D, .ALPHA.-TOCOPHEROL, THIAMINE MONONITRATE, RIBOFLAVIN, NIACIN, PYRIDOXINE HYDROCHLORIDE, FOLIC ACID, CYANOCOBALAMIN, CALCIUM, IRON, MAGNESIUM, ZINC, COPPER, AND DOCONEXENT 65-1-250MG
KIT ORAL
Qty: 90 EACH | Refills: 2 | Status: SHIPPED | OUTPATIENT
Start: 2018-04-23 | End: 2018-09-04

## 2018-04-26 ENCOUNTER — TELEPHONE (OUTPATIENT)
Dept: OBGYN CLINIC | Facility: CLINIC | Age: 33
End: 2018-04-26

## 2018-04-26 LAB — PLACENTA IN STORAGE: NORMAL

## 2018-05-07 ENCOUNTER — POSTPARTUM VISIT (OUTPATIENT)
Dept: OBGYN CLINIC | Facility: CLINIC | Age: 33
End: 2018-05-07

## 2018-05-07 VITALS — BODY MASS INDEX: 40.34 KG/M2 | DIASTOLIC BLOOD PRESSURE: 74 MMHG | SYSTOLIC BLOOD PRESSURE: 118 MMHG | WEIGHT: 235 LBS

## 2018-05-07 DIAGNOSIS — O99.213 MATERNAL MORBID OBESITY, ANTEPARTUM, THIRD TRIMESTER (HCC): ICD-10-CM

## 2018-05-07 DIAGNOSIS — Z3A.40 40 WEEKS GESTATION OF PREGNANCY: ICD-10-CM

## 2018-05-07 DIAGNOSIS — E66.01 MATERNAL MORBID OBESITY, ANTEPARTUM, THIRD TRIMESTER (HCC): ICD-10-CM

## 2018-05-07 DIAGNOSIS — O09.523 ELDERLY MULTIGRAVIDA IN THIRD TRIMESTER: ICD-10-CM

## 2018-05-07 PROCEDURE — 99024 POSTOP FOLLOW-UP VISIT: CPT | Performed by: OBSTETRICS & GYNECOLOGY

## 2018-05-07 NOTE — PROGRESS NOTES
Postpartum Visit: Patient here for postpartum visit  She is 3 weeks post partum following a spontaneous vaginal delivery  I have fully reviewed the prenatal and intrapartum course  The delivery was at 39 gestational weeks  Outcome:   Anesthesia: epidural   Postpartum course has been uncomplicated  Currently has resolving sinus/URI  Baby's course has been doing well without problems  Baby is feeding bottle pumped milk trying to latch  Patient is tolerating regular diet, Bleeding moderate lochia  Bowel function is normal  Bladder function is normal  Some TONIA, reviewed and discussed  Patient is not sexually active  Contraception method is condoms  Postpartum depression screening: negative  EPDS 1    Physical:   Vitals:    18 1817   BP: 118/74       Objective     /74 (BP Location: Left arm, Patient Position: Sitting, Cuff Size: Standard)   Wt 107 kg (235 lb)   LMP 2017 (Exact Date)   BMI 40 34 kg/m²   General appearance: alert and oriented, in no acute distress  Lungs: clear to auscultation bilaterally  Heart: regular rate and rhythm, S1, S2 normal, no murmur, click, rub or gallop  Abdomen: soft, non-tender; bowel sounds normal; no masses,  no organomegaly  Pelvic: external genitalia normal, vagina normal without discharge, uterus normal size, shape, and consistency, no cervical motion tenderness, no adnexal masses or tenderness, rectovaginal septum normal and urethral meatus        Assessment 26year-old  post vaginal delivery steadily recovering  Currently recovering from sinus infection/URI  Some stress urinary incontinence  Reviewed and discussed  Patient and  considering vasectomy    Plan:  Return in 3 months for annual or sooner as needed

## 2018-06-01 ENCOUNTER — APPOINTMENT (EMERGENCY)
Dept: NON INVASIVE DIAGNOSTICS | Facility: HOSPITAL | Age: 33
End: 2018-06-01
Payer: COMMERCIAL

## 2018-06-01 ENCOUNTER — APPOINTMENT (EMERGENCY)
Dept: RADIOLOGY | Facility: HOSPITAL | Age: 33
End: 2018-06-01
Payer: COMMERCIAL

## 2018-06-01 ENCOUNTER — HOSPITAL ENCOUNTER (EMERGENCY)
Facility: HOSPITAL | Age: 33
Discharge: HOME | End: 2018-06-01
Attending: EMERGENCY MEDICINE | Admitting: EMERGENCY MEDICINE
Payer: COMMERCIAL

## 2018-06-01 ENCOUNTER — TELEPHONE (OUTPATIENT)
Dept: OBGYN CLINIC | Facility: CLINIC | Age: 33
End: 2018-06-01

## 2018-06-01 VITALS
HEART RATE: 76 BPM | OXYGEN SATURATION: 99 % | RESPIRATION RATE: 16 BRPM | TEMPERATURE: 98.4 F | BODY MASS INDEX: 39.48 KG/M2 | WEIGHT: 230 LBS | DIASTOLIC BLOOD PRESSURE: 62 MMHG | SYSTOLIC BLOOD PRESSURE: 107 MMHG

## 2018-06-01 DIAGNOSIS — N93.9 VAGINAL BLEEDING: Primary | ICD-10-CM

## 2018-06-01 DIAGNOSIS — N83.8 OVARIAN MASS, RIGHT: ICD-10-CM

## 2018-06-01 LAB
ALBUMIN SERPL BCP-MCNC: 3.9 G/DL (ref 3.5–5)
ALP SERPL-CCNC: 50 U/L (ref 46–116)
ALT SERPL W P-5'-P-CCNC: 21 U/L (ref 12–78)
ANION GAP SERPL CALCULATED.3IONS-SCNC: 5 MMOL/L (ref 4–13)
APTT PPP: 31 SECONDS (ref 24–36)
AST SERPL W P-5'-P-CCNC: 32 U/L (ref 5–45)
ATRIAL RATE: 73 BPM
BACTERIA UR QL AUTO: ABNORMAL /HPF
BASOPHILS # BLD AUTO: 0.05 THOUSANDS/ΜL (ref 0–0.1)
BASOPHILS NFR BLD AUTO: 1 % (ref 0–1)
BILIRUB SERPL-MCNC: 0.29 MG/DL (ref 0.2–1)
BILIRUB UR QL STRIP: ABNORMAL
BUN SERPL-MCNC: 10 MG/DL (ref 5–25)
CALCIUM SERPL-MCNC: 9 MG/DL (ref 8.3–10.1)
CHLORIDE SERPL-SCNC: 105 MMOL/L (ref 100–108)
CLARITY UR: ABNORMAL
CO2 SERPL-SCNC: 25 MMOL/L (ref 21–32)
COLOR UR: ABNORMAL
CREAT SERPL-MCNC: 0.86 MG/DL (ref 0.6–1.3)
EOSINOPHIL # BLD AUTO: 0.25 THOUSAND/ΜL (ref 0–0.61)
EOSINOPHIL NFR BLD AUTO: 3 % (ref 0–6)
ERYTHROCYTE [DISTWIDTH] IN BLOOD BY AUTOMATED COUNT: 13.5 % (ref 11.6–15.1)
EXT PREG TEST URINE: NORMAL
GFR SERPL CREATININE-BSD FRML MDRD: 88 ML/MIN/1.73SQ M
GLUCOSE SERPL-MCNC: 100 MG/DL (ref 65–140)
GLUCOSE UR STRIP-MCNC: NEGATIVE MG/DL
HCT VFR BLD AUTO: 38.1 % (ref 34.8–46.1)
HGB BLD-MCNC: 12.7 G/DL (ref 11.5–15.4)
HGB UR QL STRIP.AUTO: ABNORMAL
HYALINE CASTS #/AREA URNS LPF: ABNORMAL /LPF
IMM GRANULOCYTES # BLD AUTO: 0.03 THOUSAND/UL (ref 0–0.2)
IMM GRANULOCYTES NFR BLD AUTO: 0 % (ref 0–2)
INR PPP: 1.04 (ref 0.86–1.17)
KETONES UR STRIP-MCNC: ABNORMAL MG/DL
LEUKOCYTE ESTERASE UR QL STRIP: ABNORMAL
LIPASE SERPL-CCNC: 109 U/L (ref 73–393)
LYMPHOCYTES # BLD AUTO: 1.87 THOUSANDS/ΜL (ref 0.6–4.47)
LYMPHOCYTES NFR BLD AUTO: 23 % (ref 14–44)
MCH RBC QN AUTO: 28.6 PG (ref 26.8–34.3)
MCHC RBC AUTO-ENTMCNC: 33.3 G/DL (ref 31.4–37.4)
MCV RBC AUTO: 86 FL (ref 82–98)
MONOCYTES # BLD AUTO: 0.51 THOUSAND/ΜL (ref 0.17–1.22)
MONOCYTES NFR BLD AUTO: 6 % (ref 4–12)
NEUTROPHILS # BLD AUTO: 5.44 THOUSANDS/ΜL (ref 1.85–7.62)
NEUTS SEG NFR BLD AUTO: 67 % (ref 43–75)
NITRITE UR QL STRIP: NEGATIVE
NON-SQ EPI CELLS URNS QL MICRO: ABNORMAL /HPF
NRBC BLD AUTO-RTO: 0 /100 WBCS
P AXIS: 34 DEGREES
PH UR STRIP.AUTO: 6 [PH] (ref 4.5–8)
PLATELET # BLD AUTO: 264 THOUSANDS/UL (ref 149–390)
PMV BLD AUTO: 10.7 FL (ref 8.9–12.7)
POTASSIUM SERPL-SCNC: 5.1 MMOL/L (ref 3.5–5.3)
PR INTERVAL: 118 MS
PROT SERPL-MCNC: 8.2 G/DL (ref 6.4–8.2)
PROT UR STRIP-MCNC: ABNORMAL MG/DL
PROTHROMBIN TIME: 13.7 SECONDS (ref 11.8–14.2)
QRS AXIS: 17 DEGREES
QRSD INTERVAL: 76 MS
QT INTERVAL: 404 MS
QTC INTERVAL: 445 MS
RBC # BLD AUTO: 4.44 MILLION/UL (ref 3.81–5.12)
RBC #/AREA URNS AUTO: ABNORMAL /HPF
SODIUM SERPL-SCNC: 135 MMOL/L (ref 136–145)
SP GR UR STRIP.AUTO: 1.02 (ref 1–1.03)
T WAVE AXIS: 25 DEGREES
UROBILINOGEN UR QL STRIP.AUTO: 1 E.U./DL
VENTRICULAR RATE: 73 BPM
WBC # BLD AUTO: 8.15 THOUSAND/UL (ref 4.31–10.16)
WBC #/AREA URNS AUTO: ABNORMAL /HPF

## 2018-06-01 PROCEDURE — 81001 URINALYSIS AUTO W/SCOPE: CPT | Performed by: EMERGENCY MEDICINE

## 2018-06-01 PROCEDURE — 85730 THROMBOPLASTIN TIME PARTIAL: CPT | Performed by: EMERGENCY MEDICINE

## 2018-06-01 PROCEDURE — 76856 US EXAM PELVIC COMPLETE: CPT

## 2018-06-01 PROCEDURE — 76830 TRANSVAGINAL US NON-OB: CPT

## 2018-06-01 PROCEDURE — 80053 COMPREHEN METABOLIC PANEL: CPT | Performed by: EMERGENCY MEDICINE

## 2018-06-01 PROCEDURE — 96361 HYDRATE IV INFUSION ADD-ON: CPT

## 2018-06-01 PROCEDURE — 85025 COMPLETE CBC W/AUTO DIFF WBC: CPT | Performed by: EMERGENCY MEDICINE

## 2018-06-01 PROCEDURE — 93971 EXTREMITY STUDY: CPT

## 2018-06-01 PROCEDURE — 93010 ELECTROCARDIOGRAM REPORT: CPT | Performed by: INTERNAL MEDICINE

## 2018-06-01 PROCEDURE — 96360 HYDRATION IV INFUSION INIT: CPT

## 2018-06-01 PROCEDURE — 83690 ASSAY OF LIPASE: CPT | Performed by: EMERGENCY MEDICINE

## 2018-06-01 PROCEDURE — 36415 COLL VENOUS BLD VENIPUNCTURE: CPT | Performed by: EMERGENCY MEDICINE

## 2018-06-01 PROCEDURE — 93005 ELECTROCARDIOGRAM TRACING: CPT

## 2018-06-01 PROCEDURE — 99285 EMERGENCY DEPT VISIT HI MDM: CPT

## 2018-06-01 PROCEDURE — 85610 PROTHROMBIN TIME: CPT | Performed by: EMERGENCY MEDICINE

## 2018-06-01 PROCEDURE — 81025 URINE PREGNANCY TEST: CPT | Performed by: EMERGENCY MEDICINE

## 2018-06-01 PROCEDURE — 96374 THER/PROPH/DIAG INJ IV PUSH: CPT

## 2018-06-01 RX ORDER — KETOROLAC TROMETHAMINE 30 MG/ML
15 INJECTION, SOLUTION INTRAMUSCULAR; INTRAVENOUS ONCE
Status: COMPLETED | OUTPATIENT
Start: 2018-06-01 | End: 2018-06-01

## 2018-06-01 RX ORDER — METHYLERGONOVINE MALEATE 0.2 MG/1
0.2 TABLET ORAL ONCE
Status: COMPLETED | OUTPATIENT
Start: 2018-06-01 | End: 2018-06-01

## 2018-06-01 RX ORDER — METHYLERGONOVINE MALEATE 0.2 MG/1
0.2 TABLET ORAL 3 TIMES DAILY
Qty: 9 TABLET | Refills: 0 | Status: SHIPPED | OUTPATIENT
Start: 2018-06-01 | End: 2018-06-01

## 2018-06-01 RX ORDER — METHYLERGONOVINE MALEATE 0.2 MG/1
0.2 TABLET ORAL 3 TIMES DAILY
Qty: 9 TABLET | Refills: 0 | Status: SHIPPED | OUTPATIENT
Start: 2018-06-01 | End: 2018-09-04

## 2018-06-01 RX ADMIN — KETOROLAC TROMETHAMINE 15 MG: 30 INJECTION, SOLUTION INTRAMUSCULAR at 15:53

## 2018-06-01 RX ADMIN — METHYLERGONOVINE MALEATE 0.2 MG: 0.2 TABLET ORAL at 18:53

## 2018-06-01 RX ADMIN — SODIUM CHLORIDE 1000 ML: 0.9 INJECTION, SOLUTION INTRAVENOUS at 15:44

## 2018-06-01 NOTE — ED PROVIDER NOTES
History  Chief Complaint   Patient presents with    Vaginal Bleeding     HPI  78-year-old female 7 weeks postpartum presents with complaints of vaginal bleeding as well as abdominal cramping  Patient says she had a spontaneous vaginal delivery 7 weeks ago and has had persistent vaginal bleeding since then  Says she going to roughly 1 pad a soaked through per day  Says over the last several days she has had lower abdominal cramping that feels similar to menstrual cramps  Patient says this morning when she went to use the bathroom there was a large gush of blood that had several small clots  Patient says she called her OB and they recommended she come in for evaluation  Patient otherwise denies dysuria, vaginal discharge  She is moving her bowels normally no fevers, chills, nausea, vomiting, diarrhea, chest pain, shortness of breath  Patient says she has had some cramping in her right calf over the last several days as well  She is not on any estrogen no hemoptysis her recent prolonged immobilization  Patient says she is breast feeding  She has not gotten a menstrual cycle yet either  Twelve systems reviewed otherwise negative except as stated in HPI  Impression and plan 78-year-old female 7 weeks postpartum presents with persistent vaginal bleeding that got worse today  Denies any pain  She is well-appearing has normal vital signs  Concern for possible retained products of conception more likely menses this far out  Also complaining of right calf pain we will get a venous duplex of the right lower extremity as well as pelvic ultrasound to assess for retained products of conception check basic labs type and screen reassess  Prior to Admission Medications   Prescriptions Last Dose Informant Patient Reported? Taking?    Prenatal MV-Min-Fe Fum-FA-DHA (VITAFOL-OB+DHA) 65-1 & 250 MG MISC   No No   Sig: TAKE ONE BY MOUTH EVERY DAY   acetaminophen (TYLENOL) 325 mg tablet   No No   Sig: headache   albuterol (2 5 mg/3 mL) 0 083 % nebulizer solution  Self Yes No   Sig: Inhale 2 5 mg every 4 (four) hours as needed     albuterol (PROAIR HFA) 90 mcg/act inhaler  Self Yes No   Sig: Inhale 1-2 puffs every 4 (four) hours as needed     benzocaine-menthol-lanolin-aloe (DERMOPLAST) 20-0 5 % topical spray   No No   Sig: Apply 1 application topically 4 (four) times a day as needed for mild pain   fluticasone-salmeterol (ADVAIR DISKUS) 100-50 mcg/dose  Self Yes No   Sig: Inhale 1 puff 2 (two) times a day as needed     hydrocortisone 1 % cream   No No   Sig: Apply 1 application topically as needed for irritation   ibuprofen (MOTRIN) 600 mg tablet   No No   Sig: Take 1 tablet (600 mg total) by mouth every 6 (six) hours as needed for mild pain   montelukast (SINGULAIR) 10 mg tablet  Self Yes No   Sig: Take 1 tablet by mouth daily as needed     witch hazel-glycerin (TUCKS) topical pad   No No   Sig: Apply 1 pad topically as needed for irritation      Facility-Administered Medications: None       Past Medical History:   Diagnosis Date    Abnormal Pap smear of cervix     Animal dander allergy     Asthma     with exacerbation last assessed 2015    Pregnancy     :2010 [facility]AB-natural;  F IOL for oligo-meconium    Seasonal allergies     Spontaneous      last assessed oct 3 2016    Varicella     HX DISEASE    Visual impairment     eyewear        Past Surgical History:   Procedure Laterality Date    MOLE REMOVAL      on stomach       Family History   Problem Relation Age of Onset    No Known Problems Mother     Asthma Maternal Grandfather     Parkinsonism Other     Hypothyroidism Other     Thyroid disease Other      I have reviewed and agree with the history as documented  Social History   Substance Use Topics    Smoking status: Never Smoker    Smokeless tobacco: Never Used    Alcohol use No        Review of Systems   Constitutional: Negative for chills and fever     HENT: Negative for sore throat, trouble swallowing and voice change  Eyes: Negative for photophobia and visual disturbance  Respiratory: Negative for cough and shortness of breath  Cardiovascular: Negative for chest pain, palpitations and leg swelling  Gastrointestinal: Positive for abdominal pain (cramps)  Negative for abdominal distention, anal bleeding, blood in stool, constipation, diarrhea, nausea, rectal pain and vomiting  Endocrine: Negative for polyphagia and polyuria  Genitourinary: Positive for vaginal bleeding  Negative for decreased urine volume, difficulty urinating, dyspareunia, dysuria, enuresis, flank pain, frequency, genital sores, hematuria, menstrual problem, pelvic pain, urgency, vaginal discharge and vaginal pain  Musculoskeletal: Negative for back pain, gait problem and joint swelling  Skin: Negative for rash and wound  Allergic/Immunologic: Negative  Neurological: Negative for dizziness, facial asymmetry and numbness  Hematological: Negative for adenopathy  Does not bruise/bleed easily  Psychiatric/Behavioral: Negative for agitation  Physical Exam  ED Triage Vitals [06/01/18 1351]   Temperature Pulse Respirations Blood Pressure SpO2   98 4 °F (36 9 °C) (!) 106 16 128/71 98 %      Temp Source Heart Rate Source Patient Position - Orthostatic VS BP Location FiO2 (%)   Oral Monitor Sitting Right arm --      Pain Score       4           Orthostatic Vital Signs  Vitals:    06/01/18 1351 06/01/18 1706   BP: 128/71 107/62   Pulse: (!) 106 76   Patient Position - Orthostatic VS: Sitting        Physical Exam   Constitutional: She is oriented to person, place, and time  She appears well-developed and well-nourished  No distress  HENT:   Head: Normocephalic and atraumatic  Right Ear: No hemotympanum  Left Ear: No hemotympanum  Nose: Nose normal  No nasal septal hematoma  Mouth/Throat: Uvula is midline, oropharynx is clear and moist and mucous membranes are normal  She does not have dentures   No oropharyngeal exudate  Eyes: Conjunctivae and EOM are normal  Pupils are equal, round, and reactive to light  Right eye exhibits no discharge  Left eye exhibits no discharge  No scleral icterus  Right eye exhibits no nystagmus  Left eye exhibits no nystagmus  Neck: Trachea normal, normal range of motion, full passive range of motion without pain and phonation normal  Neck supple  No JVD present  No tracheal tenderness present  No tracheal deviation present  No thyromegaly present  No c-spine tenderness   Cardiovascular: Normal rate, regular rhythm, normal heart sounds and intact distal pulses  Exam reveals no gallop and no friction rub  No murmur heard  Pulmonary/Chest: Effort normal and breath sounds normal  No stridor  No respiratory distress  She has no wheezes  She has no rales  She exhibits no tenderness  Abdominal: Soft  Bowel sounds are normal  She exhibits no distension and no mass  There is tenderness (mild lower abdominal tenderness  no r/g)  There is no rebound and no guarding  No hernia  Musculoskeletal: Normal range of motion  She exhibits no edema, tenderness or deformity  No calf tenderness  Negative homans bl   Lymphadenopathy:     She has no cervical adenopathy  Neurological: She is alert and oriented to person, place, and time  She has normal strength  No cranial nerve deficit or sensory deficit  GCS eye subscore is 4  GCS verbal subscore is 5  GCS motor subscore is 6  Reflex Scores:       Patellar reflexes are 2+ on the right side and 2+ on the left side  Achilles reflexes are 2+ on the right side and 2+ on the left side  Skin: Skin is warm and dry  Capillary refill takes less than 2 seconds  No rash noted  She is not diaphoretic  No erythema  No pallor  Psychiatric: She has a normal mood and affect  Nursing note and vitals reviewed        ED Medications  Medications   sodium chloride 0 9 % bolus 1,000 mL (0 mL Intravenous Stopped 6/1/18 6127)   ketorolac (TORADOL) injection 15 mg (15 mg Intravenous Given 6/1/18 1553)   methylergonovine (METHERGINE) tablet 0 2 mg (0 2 mg Oral Given 6/1/18 1163)       Diagnostic Studies  Results Reviewed     Procedure Component Value Units Date/Time    Urine Microscopic [[id number]]  (Abnormal) Collected:  06/01/18 1605    Lab Status:  Final result Specimen:  Urine from Urine, Clean Catch Updated:  06/01/18 1706     RBC, UA Innumerable (A) /hpf      WBC, UA 4-10 (A) /hpf      Epithelial Cells None Seen /hpf      Bacteria, UA None Seen /hpf      Hyaline Casts, UA None Seen /lpf     UA w Reflex to Microscopic w Reflex to Culture [[id number]]  (Abnormal) Collected:  06/01/18 1605    Lab Status:  Final result Specimen:  Urine from Urine, Clean Catch Updated:  06/01/18 1702     Color, UA Orange     Clarity, UA Turbid     Specific Gravity, UA 1 021     pH, UA 6 0     Leukocytes, UA Moderate (A)     Nitrite, UA Negative     Protein,  (2+) (A) mg/dl      Glucose, UA Negative mg/dl      Ketones, UA 15 (1+) (A) mg/dl      Urobilinogen, UA 1 0 E U /dl      Bilirubin, UA Interference- unable to analyze (A)     Blood, UA Large (A)    Protime-INR [[id number]]  (Normal) Collected:  06/01/18 1542    Lab Status:  Final result Specimen:  Blood from Arm, Left Updated:  06/01/18 1623     Protime 13 7 seconds      INR 1 04    APTT [[id number]]  (Normal) Collected:  06/01/18 1542    Lab Status:  Final result Specimen:  Blood from Arm, Left Updated:  06/01/18 1623     PTT 31 seconds     Comprehensive metabolic panel [[id number]]  (Abnormal) Collected:  06/01/18 1542    Lab Status:  Final result Specimen:  Blood from Arm, Left Updated:  06/01/18 1609     Sodium 135 (L) mmol/L      Potassium 5 1 mmol/L      Chloride 105 mmol/L      CO2 25 mmol/L      Anion Gap 5 mmol/L      BUN 10 mg/dL      Creatinine 0 86 mg/dL      Glucose 100 mg/dL      Calcium 9 0 mg/dL      AST 32 U/L      ALT 21 U/L      Alkaline Phosphatase 50 U/L      Total Protein 8 2 g/dL      Albumin 3 9 g/dL Total Bilirubin 0 29 mg/dL      eGFR 88 ml/min/1 73sq m     Narrative:         National Kidney Disease Education Program recommendations are as follows:  GFR calculation is accurate only with a steady state creatinine  Chronic Kidney disease less than 60 ml/min/1 73 sq  meters  Kidney failure less than 15 ml/min/1 73 sq  meters  Lipase [[id number]]  (Normal) Collected:  06/01/18 1542    Lab Status:  Final result Specimen:  Blood from Arm, Left Updated:  06/01/18 1609     Lipase 109 u/L     POCT pregnancy, urine [[id number]]  (Normal) Resulted:  06/01/18 1604    Lab Status:  Final result Updated:  06/01/18 1604     EXT PREG TEST UR (Ref: Negative) neg    POCT urinalysis dipstick [[id number]]  (Normal) Resulted:  06/01/18 1604    Lab Status:  Final result Specimen:  Urine Updated:  06/01/18 1604     Color, UA --    CBC and differential [[id number]] Collected:  06/01/18 1542    Lab Status:  Final result Specimen:  Blood from Arm, Left Updated:  06/01/18 1552     WBC 8 15 Thousand/uL      RBC 4 44 Million/uL      Hemoglobin 12 7 g/dL      Hematocrit 38 1 %      MCV 86 fL      MCH 28 6 pg      MCHC 33 3 g/dL      RDW 13 5 %      MPV 10 7 fL      Platelets 525 Thousands/uL      nRBC 0 /100 WBCs      Neutrophils Relative 67 %      Immat GRANS % 0 %      Lymphocytes Relative 23 %      Monocytes Relative 6 %      Eosinophils Relative 3 %      Basophils Relative 1 %      Neutrophils Absolute 5 44 Thousands/µL      Immature Grans Absolute 0 03 Thousand/uL      Lymphocytes Absolute 1 87 Thousands/µL      Monocytes Absolute 0 51 Thousand/µL      Eosinophils Absolute 0 25 Thousand/µL      Basophils Absolute 0 05 Thousands/µL                  US pelvis complete w transvaginal   Final Result by Chris Cooper MD (06/01 1632)       1  Abnormally thickened, vascular and heterogeneous endometrium up to 19 mm, suspicious for retained products of conception     2   1 5 cm hyperechoic mass in the right ovary with exuberant internal vascularity  Contrast enhanced MRI of the pelvis recommended for further characterization  Both benign and malignant mass lesions are the differential diagnosis  Further clinical    correlation and follow-up recommended  I personally discussed this study with JOVANI VUONG [address] [place] [address] on 6/1/2018 at 4:32 PM                                Workstation performed: [id number]         VAS lower limb venous duplex study, unilateral/limited    (Results Pending)         Procedures  Procedures      Phone Consults  ED Phone Contact    ED Course  ED Course as of Jun 01 2341 Fri Jun 01, 2018   1521 Spoke with ob  They will see the patient    1554 Hemoglobin: 12 7   1652 RLE negative for dvt per vascular tech    1715 WBC, UA: (!) 4-10   1821 Spoke with dr Kelechi Alexander of ob  Plan is to start pt  On methergine  Rx given by GYN resident  We will give first dose here  Pt  Will f/u with GYN next week  Strict return precuaitons discussed  MDM  CritCare Time    Disposition  Final diagnoses:   Vaginal bleeding   Ovarian mass, right     Time reflects when diagnosis was documented in both MDM as applicable and the Disposition within this note     Time User Action Codes Description Comment    6/1/2018  5:50 PM Eliseo Main Add [N93 9] Vaginal bleeding     6/1/2018  6:22 PM Huber VUONG Add [N83 9] Ovarian mass, right     6/1/2018  6:23 PM Huber VUONG Modify [N93 9] Vaginal bleeding       ED Disposition     ED Disposition Condition Comment    Discharge  [facility] discharge to home/self care      Condition at discharge: Good        Follow-up Information     Follow up With Specialties Details Why Contact Info Additional Information    Aldo Russo MD Obstetrics and Gynecology, Obstetrics, Gynecology Call Please call and make an appointment early next week to follow up [address], Internal Medicine  As needed [phone] [address] [facility]  [address] [facility] Emergency Department Emergency Medicine  If symptoms worsen [address]  [phone]  ED, [address], [place], [place], [address]          Discharge Medication List as of 6/1/2018  6:23 PM      CONTINUE these medications which have CHANGED    Details   methylergonovine (METHERGINE) 0 2 mg tablet Take 1 tablet (0 2 mg total) by mouth 3 (three) times a day, Starting Fri 6/1/2018, Print         CONTINUE these medications which have NOT CHANGED    Details   acetaminophen (TYLENOL) 325 mg tablet headache, No Print      albuterol (2 5 mg/3 mL) 0 083 % nebulizer solution Inhale 2 5 mg every 4 (four) hours as needed  , Historical Med      albuterol (PROAIR HFA) 90 mcg/act inhaler Inhale 1-2 puffs every 4 (four) hours as needed  , Starting Mon 4/13/2015, Historical Med      benzocaine-menthol-lanolin-aloe (DERMOPLAST) 20-0 5 % topical spray Apply 1 application topically 4 (four) times a day as needed for mild pain, Starting Tue 4/17/2018, No Print      fluticasone-salmeterol (ADVAIR DISKUS) 100-50 mcg/dose Inhale 1 puff 2 (two) times a day as needed  , Starting Mon 5/8/2017, Historical Med      hydrocortisone 1 % cream Apply 1 application topically as needed for irritation, Starting Tue 4/17/2018, No Print      ibuprofen (MOTRIN) 600 mg tablet Take 1 tablet (600 mg total) by mouth every 6 (six) hours as needed for mild pain, Starting Tue 4/17/2018, No Print      montelukast (SINGULAIR) 10 mg tablet Take 1 tablet by mouth daily as needed  , Starting Mon 5/8/2017, Historical Med      Prenatal MV-Min-Fe Fum-FA-DHA (VITAFOL-OB+DHA) 65-1 & 250 MG MISC TAKE ONE BY MOUTH EVERY DAY, Normal      witch hazel-glycerin (TUCKS) topical pad Apply 1 pad topically as needed for irritation, Starting Tue 4/17/2018, No Print           No discharge procedures on file      ED Provider  Attending physically available and evaluated Julius Wynn I managed the patient along with the ED Attending      Electronically Signed by         Jorge Eckert MD  06/01/18 0056

## 2018-06-01 NOTE — ED ATTENDING ATTESTATION
Yessy Alexandra MD, saw and evaluated the patient  I have discussed the patient with the resident/non-physician practitioner and agree with the resident's/non-physician practitioner's findings, Plan of Care, and MDM as documented in the resident's/non-physician practitioner's note, except where noted  All available labs and Radiology studies were reviewed  At this point I agree with the current assessment done in the Emergency Department  I have conducted an independent evaluation of this patient a history and physical is as follows:      Critical Care Time  CritCare Time    Procedures     27 yo female with vaginal bleeding  For 7 weeks s/p normal vaginal delivery, then started with cramps few days ago and noted blood clots today  No n/v/d, no fever, no urinary complaints  No other back or abdominal pain  No lightheadedness, no dizziness  Pt is breast feeding  vss, afebrile, lungs cta, rrr, abdomen soft tender suprapubic  Labs, type and screen, pelvic u/s, urine, discuss with ob

## 2018-06-01 NOTE — DISCHARGE INSTRUCTIONS
Please call your doctor if you have increased vaginal bleeding where you are soaking through 2 pads an hour  If you have any palpitations, dizziness, chest pain or shortness of breath please proceed to emergency room  Please follow up with your doctor early next week to check on your bleeding progress and let them know it is an emergency visit follow up to be scheduled ASAP

## 2018-06-01 NOTE — CONSULTS
Consult - OB/GYN   Itzel Nesbitt 26 y o  female MRN: [id number]  Unit/Bed#: ED 28 Encounter: [id number]    HPI:  25 yo [id number] 7 weeks postpartum following a vaginal delivery on 4/15/18 presenting for vaginal bleeding and abdominal cramping  Patient reports she has been having cramping for the past 3 days that she describes to radiate to her right thigh  She reports this afternoon at around 12:30 p m  she had a gush of blood that saturated her pad and clothing and an additional pad which made her concerned proceeded to ED  Patient states she continues to have active bleeding  She denies any dizziness, lightheadedness, syncopal episodes, palpitations, chest pain, shortness of breath, nausea or vomiting, diarrhea or constipation  She denies any sexual activity since the delivery and is not currently on any birth control  She is actively and regularly breastfeeding/ pumping  She reports she has been having mild to moderate vaginal bleeding since her delivery where she will use 1 pad a day  Delivery was significant for cord avulsion which resulted in a manual extraction of placenta- in delivery summary it is noted that placenta was examined and was noted to be intact with trailing membranes  She is a patient of Dr Pablo Cano      Active Problems:  Patient Active Problem List   Diagnosis    Asthma    Elderly multigravida in third trimester    Prenatal care, subsequent pregnancy, third trimester    Maternal morbid obesity, antepartum, third trimester ([facility] [address] [facility]    BMI 40 0-44 9, adult ([facility] [address] )    40 weeks gestation of pregnancy     (spontaneous vaginal delivery)     PMH:  Past Medical History:   Diagnosis Date    Abnormal Pap smear of cervix     Animal dander allergy     Asthma     with exacerbation last assessed 2015    Pregnancy     : SAB-natural;  F IOL for oligo-meconium    Seasonal allergies     Spontaneous      last assessed oct 3 2016    Varicella     HX DISEASE    Visual impairment     eyewear      PSH:  Past Surgical History:   Procedure Laterality Date    MOLE REMOVAL      on stomach     Meds:  No current facility-administered medications on file prior to encounter  Current Outpatient Prescriptions on File Prior to Encounter   Medication Sig Dispense Refill    acetaminophen (TYLENOL) 325 mg tablet headache 30 tablet 0    albuterol (2 5 mg/3 mL) 0 083 % nebulizer solution Inhale 2 5 mg every 4 (four) hours as needed        albuterol (PROAIR HFA) 90 mcg/act inhaler Inhale 1-2 puffs every 4 (four) hours as needed        benzocaine-menthol-lanolin-aloe (DERMOPLAST) 20-0 5 % topical spray Apply 1 application topically 4 (four) times a day as needed for mild pain  0    fluticasone-salmeterol (ADVAIR DISKUS) 100-50 mcg/dose Inhale 1 puff 2 (two) times a day as needed        hydrocortisone 1 % cream Apply 1 application topically as needed for irritation 30 g 0    ibuprofen (MOTRIN) 600 mg tablet Take 1 tablet (600 mg total) by mouth every 6 (six) hours as needed for mild pain 30 tablet 0    montelukast (SINGULAIR) 10 mg tablet Take 1 tablet by mouth daily as needed        Prenatal MV-Min-Fe Fum-FA-DHA (VITAFOL-OB+DHA) 65-1 & 250 MG MISC TAKE ONE BY MOUTH EVERY DAY 90 each 2    witch hazel-glycerin (TUCKS) topical pad Apply 1 pad topically as needed for irritation 40 each 0     Allergies: Allergies   Allergen Reactions    Cat Hair Extract Allergic Rhinitis     Asthma flares up    Banana GI Intolerance    Other Allergic Rhinitis     Seasonal allergies      Physical Exam:  /62 (BP Location: Left arm)   Pulse 76   Temp 98 4 °F (36 9 °C) (Oral)   Resp 16   LMP 07/02/2017 (Exact Date)   SpO2 99%     Physical Exam   Constitutional: She is oriented to person, place, and time  She appears well-developed and well-nourished  No distress  Obese female- appears well and energetic  HENT:   Head: Normocephalic and atraumatic  Neck: Normal range of motion   Neck supple  Cardiovascular: Normal rate and regular rhythm  Pulmonary/Chest: Effort normal and breath sounds normal  No respiratory distress  Abdominal: Soft  There is no rebound and no guarding  Mild tenderness to lower abdomen   Genitourinary:   Genitourinary Comments: Pad worn appears 1/4 saturated  Labia, and vagina appear normal without lesions  SSE: Small 25 cc clots noted in vaginal vault and removed  Active small clots flowing from cervix at a moderate flow  No cervical lacerations or lesions noted- cervix appears 1 cm dilated  Neurological: She is alert and oriented to person, place, and time  Skin: Skin is warm and dry  She is not diaphoretic  Psychiatric: She has a normal mood and affect  Her behavior is normal    Vitals reviewed      Results Reviewed     Procedure Component Value Units Date/Time    Urine Microscopic [[id number]]  (Abnormal) Collected:  06/01/18 1605    Lab Status:  Final result Specimen:  Urine from Urine, Clean Catch Updated:  06/01/18 1706     RBC, UA Innumerable (A) /hpf      WBC, UA 4-10 (A) /hpf      Epithelial Cells None Seen /hpf      Bacteria, UA None Seen /hpf      Hyaline Casts, UA None Seen /lpf     UA w Reflex to Microscopic w Reflex to Culture [[id number]]  (Abnormal) Collected:  06/01/18 1605    Lab Status:  Final result Specimen:  Urine from Urine, Clean Catch Updated:  06/01/18 1702     Color, UA Orange     Clarity, UA Turbid     Specific Gravity, UA 1 021     pH, UA 6 0     Leukocytes, UA Moderate (A)     Nitrite, UA Negative     Protein,  (2+) (A) mg/dl      Glucose, UA Negative mg/dl      Ketones, UA 15 (1+) (A) mg/dl      Urobilinogen, UA 1 0 E U /dl      Bilirubin, UA Interference- unable to analyze (A)     Blood, UA Large (A)    Protime-INR [[id number]]  (Normal) Collected:  06/01/18 1542    Lab Status:  Final result Specimen:  Blood from Arm, Left Updated:  06/01/18 1623     Protime 13 7 seconds      INR 1 04    APTT [[id number]]  (Normal) Collected: 06/01/18 1542    Lab Status:  Final result Specimen:  Blood from Arm, Left Updated:  06/01/18 1623     PTT 31 seconds     Comprehensive metabolic panel [[id number]]  (Abnormal) Collected:  06/01/18 1542    Lab Status:  Final result Specimen:  Blood from Arm, Left Updated:  06/01/18 1609     Sodium 135 (L) mmol/L      Potassium 5 1 mmol/L      Chloride 105 mmol/L      CO2 25 mmol/L      Anion Gap 5 mmol/L      BUN 10 mg/dL      Creatinine 0 86 mg/dL      Glucose 100 mg/dL      Calcium 9 0 mg/dL      AST 32 U/L      ALT 21 U/L      Alkaline Phosphatase 50 U/L      Total Protein 8 2 g/dL      Albumin 3 9 g/dL      Total Bilirubin 0 29 mg/dL      eGFR 88 ml/min/1 73sq m     Narrative:         National Kidney Disease Education Program recommendations are as follows:  GFR calculation is accurate only with a steady state creatinine  Chronic Kidney disease less than 60 ml/min/1 73 sq  meters  Kidney failure less than 15 ml/min/1 73 sq  meters      Lipase [[id number]]  (Normal) Collected:  06/01/18 1542    Lab Status:  Final result Specimen:  Blood from Arm, Left Updated:  06/01/18 1609     Lipase 109 u/L     POCT pregnancy, urine [[id number]]  (Normal) Resulted:  06/01/18 1604    Lab Status:  Final result Updated:  06/01/18 1604     EXT PREG TEST UR (Ref: Negative) neg    POCT urinalysis dipstick [[id number]]  (Normal) Resulted:  06/01/18 1604    Lab Status:  Final result Specimen:  Urine Updated:  06/01/18 1604     Color, UA --    CBC and differential [[id number]] Collected:  06/01/18 1542    Lab Status:  Final result Specimen:  Blood from Arm, Left Updated:  06/01/18 1552     WBC 8 15 Thousand/uL      RBC 4 44 Million/uL      Hemoglobin 12 7 g/dL      Hematocrit 38 1 %      MCV 86 fL      MCH 28 6 pg      MCHC 33 3 g/dL      RDW 13 5 %      MPV 10 7 fL      Platelets 609 Thousands/uL      nRBC 0 /100 WBCs      Neutrophils Relative 67 %      Immat GRANS % 0 %      Lymphocytes Relative 23 %      Monocytes Relative 6 %      Eosinophils Relative 3 %      Basophils Relative 1 %      Neutrophils Absolute 5 44 Thousands/µL      Immature Grans Absolute 0 03 Thousand/uL      Lymphocytes Absolute 1 87 Thousands/µL      Monocytes Absolute 0 51 Thousand/µL      Eosinophils Absolute 0 25 Thousand/µL      Basophils Absolute 0 05 Thousands/µL         Assessment and Plan:  27 yo [id number] 7 weeks postpartum following a vaginal delivery on 4/15/18 with vaginal bleeding and abdominal cramping  1  Heavy vaginal bleeding: following review of transvaginal ultrasound ordered by ED with             attending Dr Ivon Banegas 19 mm endometrial lining appears to be mostly blood and clot and the fact that patient is currently 7 weeks postpartum is most likely resumption of menses  Although from imaging small amount of retained products cannot be ruled out the patient is with stable hemoglobin and vital signs and at this time instrumentation of the uterus could pose more risk than benefit  At point of reassessment and discussion with patient at 1800 she reports subjective decrease in bleeding    - Methergine PO 0 2 mg PO TID for 3 days with one dose in ED today   - Cleared from GYN standpoint for close follow up with Dr Ivon Banegas in office early next     week- discussed with patient precautions for excessive bleeding of saturating 2 pads     an hour for 2 consecutive hours or any signs of infection to call her doctor  She was     given additional precautions that if she has any dizziness, light-headedness,      palpitations, chest pain or shortness of breath to present to ED following call to her     doctor  2  Abdominal cramping:    - Patient advised to take Motrin 600 mg PO Q 6 hours PRN for cramping    3  Thigh pain: per ED management   Patient sent for venous duplex    Images, patient case and plan was reviewed with Dr Lashay Fernandez MD   06/01/18  6:47 PM

## 2018-06-02 PROCEDURE — 93971 EXTREMITY STUDY: CPT | Performed by: SURGERY

## 2018-06-04 ENCOUNTER — TELEPHONE (OUTPATIENT)
Dept: OBGYN CLINIC | Facility: CLINIC | Age: 33
End: 2018-06-04

## 2018-06-05 ENCOUNTER — POSTPARTUM VISIT (OUTPATIENT)
Dept: OBGYN CLINIC | Facility: CLINIC | Age: 33
End: 2018-06-05
Payer: COMMERCIAL

## 2018-06-05 VITALS
DIASTOLIC BLOOD PRESSURE: 82 MMHG | HEIGHT: 64 IN | SYSTOLIC BLOOD PRESSURE: 120 MMHG | BODY MASS INDEX: 39.44 KG/M2 | WEIGHT: 231 LBS

## 2018-06-05 DIAGNOSIS — N83.8 OVARIAN MASS, RIGHT: Primary | ICD-10-CM

## 2018-06-05 PROCEDURE — 99213 OFFICE O/P EST LOW 20 MIN: CPT | Performed by: NURSE PRACTITIONER

## 2018-06-07 PROBLEM — N83.8 OVARIAN MASS, RIGHT: Status: ACTIVE | Noted: 2018-06-07

## 2018-06-07 NOTE — ASSESSMENT & PLAN NOTE
Normal exam today  Pt to continue to watch bleeding, should be stopping if does not stop or bleeding becomes heavier or passing clots again patient to call office  Most likely has passed everything at this point  Reviewed pelvic ultrasound results, was recommended for patient to have MRI, pt declined, but did agree to a follow up ultrasound  Rx for follow-up pelvic ultrasound given    RTO for annual exam

## 2018-06-13 ENCOUNTER — VBI (OUTPATIENT)
Dept: FAMILY MEDICINE CLINIC | Facility: CLINIC | Age: 33
End: 2018-06-13

## 2018-06-13 NOTE — TELEPHONE ENCOUNTER
Pt was seen in [address] [place] [address] Rd on 6/1/18  CC: Vaginal Bleeding DX: Janis Rucker Vaginal bleeding; ovarian mass right   Left Message  Informed Pt of  on call, office hours and phone number

## 2018-09-04 ENCOUNTER — ANNUAL EXAM (OUTPATIENT)
Dept: OBGYN CLINIC | Facility: CLINIC | Age: 33
End: 2018-09-04
Payer: COMMERCIAL

## 2018-09-04 VITALS
BODY MASS INDEX: 39.09 KG/M2 | WEIGHT: 229 LBS | HEIGHT: 64 IN | DIASTOLIC BLOOD PRESSURE: 80 MMHG | SYSTOLIC BLOOD PRESSURE: 124 MMHG

## 2018-09-04 DIAGNOSIS — Z01.419 GYNECOLOGIC EXAM NORMAL: Primary | ICD-10-CM

## 2018-09-04 PROCEDURE — S0612 ANNUAL GYNECOLOGICAL EXAMINA: HCPCS | Performed by: PHYSICIAN ASSISTANT

## 2018-09-04 NOTE — PROGRESS NOTES
Assessment/Plan   Problem List Items Addressed This Visit     Gynecologic exam normal - Primary     Pap guidelines reviewed  Pap with reflex done today  Will plan to get pelvic ultrasound done to evaluate cyst  Reviewed to have done in the next few months even if does not get menses while breastfeeding  Get done sooner if has pain or discomfort  Return to office for annual or as needed  Relevant Orders    GP PAP (RFLX HPV PLUS WHEN ASCUS)          Subjective:     Patient ID: Marcus Renee is a 26 y o  y o  female  HPI  25 yo seen for annual exam  Patient in 5 mo PP doing well  No menses yet  Denies bowel or bladder issues  Patient was seen at 7 wks PP for large amount of bleeding thought to be retained products of conception  Bleeding stopped with Megace  Has not had any bleeding since  Pelvic ultrasound done on 2018 showed 1 5 cm hyperechoic mass in the right ovary with exuberant internal vascularity  Patient was given a script to repeat after next menses but with breastfeeding has not gotten menses yet  Denies any pelvic pain or discomfort  Last pap: 2016 NILM (-)HRHPV  The following portions of the patient's history were reviewed and updated as appropriate:   She  has a past medical history of Animal dander allergy; Asthma; Pregnancy; Seasonal allergies; Spontaneous ; Varicella; and Visual impairment    She   Patient Active Problem List    Diagnosis Date Noted    Gynecologic exam normal 2018    Abnormal uterine bleeding, postpartum 2018    Ovarian mass, right 2018    Vaginal bleeding 2018     (spontaneous vaginal delivery) 04/15/2018    Maternal morbid obesity, antepartum, third trimester ([facility] ) 2018    BMI 40 0-44 9, adult ([facility] ) 2018    40 weeks gestation of pregnancy 2018    Prenatal care, subsequent pregnancy, third trimester 2018    Elderly multigravida in third trimester 2017    Asthma 2015     She has a past surgical history that includes Mole removal   Her family history includes Asthma in her maternal grandfather; Hypothyroidism in her other; No Known Problems in her mother; Parkinsonism in her other; Thyroid disease in her other  She  reports that she has never smoked  She has never used smokeless tobacco  She reports that she does not drink alcohol or use drugs  Current Outpatient Prescriptions   Medication Sig Dispense Refill    albuterol (2 5 mg/3 mL) 0 083 % nebulizer solution Inhale 2 5 mg every 4 (four) hours as needed        albuterol (PROAIR HFA) 90 mcg/act inhaler Inhale 1-2 puffs every 4 (four) hours as needed        fluticasone-salmeterol (ADVAIR DISKUS) 100-50 mcg/dose Inhale 1 puff 2 (two) times a day as needed        montelukast (SINGULAIR) 10 mg tablet Take 1 tablet by mouth daily as needed         No current facility-administered medications for this visit  She is allergic to cat hair extract; banana; and other       Menstrual History:  OB History      Para Term  AB Living    4 2 2   2 2    SAB TAB Ectopic Multiple Live Births    2     0 2        Obstetric Comments    Asthma, elderly multigravida         Menarche age: 13  No LMP recorded  Review of Systems   Constitutional: Negative for fatigue, fever and unexpected weight change  HENT: Negative for dental problem and sinus pressure  Eyes: Negative for visual disturbance  Respiratory: Negative for cough, shortness of breath and wheezing  Cardiovascular: Negative for chest pain  Gastrointestinal: Negative for abdominal pain, blood in stool, constipation, diarrhea, nausea and vomiting  Endocrine: Negative for polydipsia  Genitourinary: Negative for difficulty urinating, dyspareunia, dysuria, frequency, hematuria, pelvic pain and urgency  Musculoskeletal: Negative for arthralgias and back pain  Neurological: Negative for dizziness, seizures, light-headedness and headaches  Psychiatric/Behavioral: Negative for suicidal ideas  The patient is not nervous/anxious  Objective:  Vitals:    09/04/18 1744   BP: 124/80   BP Location: Left arm   Patient Position: Sitting   Cuff Size: Large   Weight: 104 kg (229 lb)   Height: 5' 4" (1 626 m)      Physical Exam   Constitutional: She is oriented to person, place, and time  She appears well-developed and well-nourished  Genitourinary: Vagina normal and uterus normal  There is no rash, tenderness, lesion, injury or Bartholin's cyst on the right labia  There is no rash, tenderness, lesion, injury or Bartholin's cyst on the left labia  Vagina exhibits no lesion  No erythema, tenderness or bleeding in the vagina  No signs of injury around the vagina  No vaginal discharge found  Right adnexum does not display mass, does not display tenderness and does not display fullness  Left adnexum does not display mass, does not display tenderness and does not display fullness  Cervix does not exhibit motion tenderness, lesion or discharge  Uterus is not enlarged, tender, exhibiting a mass, irregular (is regular) or mobile  HENT:   Head: Normocephalic and atraumatic  Neck: No thyromegaly present  Cardiovascular: Normal rate, regular rhythm and normal heart sounds  Exam reveals no gallop and no friction rub  No murmur heard  Pulmonary/Chest: Effort normal and breath sounds normal  No respiratory distress  She has no wheezes  Right breast exhibits no inverted nipple, no mass, no nipple discharge, no skin change and no tenderness  Left breast exhibits no inverted nipple, no mass, no nipple discharge, no skin change and no tenderness  Breasts are symmetrical  There is no breast swelling  Abdominal: Soft  She exhibits no distension and no mass  There is no tenderness  There is no rebound and no guarding  No hernia  Lymphadenopathy:     She has no cervical adenopathy  Right: No inguinal adenopathy present          Left: No inguinal adenopathy present  Neurological: She is alert and oriented to person, place, and time  Skin: Skin is warm and dry  Psychiatric: She has a normal mood and affect   Her behavior is normal

## 2018-09-04 NOTE — ASSESSMENT & PLAN NOTE
Pap guidelines reviewed  Pap with reflex done today  Will plan to get pelvic ultrasound done to evaluate cyst  Reviewed to have done in the next few months even if does not get menses while breastfeeding  Get done sooner if has pain or discomfort  Return to office for annual or as needed

## 2018-09-05 NOTE — PROGRESS NOTES
Pt w complaints of increased pressure  Also w increase in ctxns over the last few days, nothing regular  No vb/lof  APFS Mondays at [facility]  Baby +  seen on US bc difficult to obtain w doppler  Cx 1-2/70/-1  Will consider IOL around [facility] based on hospital availability  Was the patient seen in the last year in this department? Yes    Does patient have an active prescription for medications requested? No     Received Request Via: Pharmacy

## 2018-09-07 LAB — THIN PREP CVX: NORMAL

## 2019-07-22 ENCOUNTER — TELEPHONE (OUTPATIENT)
Dept: OBGYN CLINIC | Facility: CLINIC | Age: 34
End: 2019-07-22

## 2019-07-22 DIAGNOSIS — N83.8 OVARIAN MASS, RIGHT: Primary | ICD-10-CM

## 2019-08-28 ENCOUNTER — OFFICE VISIT (OUTPATIENT)
Dept: FAMILY MEDICINE CLINIC | Facility: CLINIC | Age: 34
End: 2019-08-28
Payer: COMMERCIAL

## 2019-08-28 VITALS
BODY MASS INDEX: 41.32 KG/M2 | TEMPERATURE: 97.6 F | SYSTOLIC BLOOD PRESSURE: 126 MMHG | HEART RATE: 70 BPM | HEIGHT: 64 IN | DIASTOLIC BLOOD PRESSURE: 74 MMHG | WEIGHT: 242 LBS | OXYGEN SATURATION: 98 %

## 2019-08-28 DIAGNOSIS — J45.20 MILD INTERMITTENT ASTHMA WITHOUT COMPLICATION: Primary | ICD-10-CM

## 2019-08-28 DIAGNOSIS — Z00.01 ENCOUNTER FOR GENERAL ADULT MEDICAL EXAMINATION WITH ABNORMAL FINDINGS: ICD-10-CM

## 2019-08-28 DIAGNOSIS — E66.01 CLASS 3 SEVERE OBESITY DUE TO EXCESS CALORIES WITHOUT SERIOUS COMORBIDITY WITH BODY MASS INDEX (BMI) OF 40.0 TO 44.9 IN ADULT (HCC): ICD-10-CM

## 2019-08-28 PROBLEM — E66.813 CLASS 3 SEVERE OBESITY IN ADULT (HCC): Status: ACTIVE | Noted: 2019-08-28

## 2019-08-28 PROCEDURE — 99395 PREV VISIT EST AGE 18-39: CPT | Performed by: FAMILY MEDICINE

## 2019-08-28 PROCEDURE — 3725F SCREEN DEPRESSION PERFORMED: CPT | Performed by: FAMILY MEDICINE

## 2019-08-28 RX ORDER — MONTELUKAST SODIUM 10 MG/1
10 TABLET ORAL
Qty: 30 TABLET | Refills: 3 | Status: SHIPPED | OUTPATIENT
Start: 2019-08-28 | End: 2020-09-11 | Stop reason: SDUPTHER

## 2019-08-28 RX ORDER — ALBUTEROL SULFATE 2.5 MG/3ML
2.5 SOLUTION RESPIRATORY (INHALATION) EVERY 4 HOURS PRN
Qty: 30 VIAL | Refills: 3 | Status: SHIPPED | OUTPATIENT
Start: 2019-08-28 | End: 2020-09-11 | Stop reason: ALTCHOICE

## 2019-08-28 RX ORDER — ALBUTEROL SULFATE 90 UG/1
1-2 AEROSOL, METERED RESPIRATORY (INHALATION) EVERY 4 HOURS PRN
Qty: 1 INHALER | Refills: 3 | Status: SHIPPED | OUTPATIENT
Start: 2019-08-28 | End: 2020-09-11 | Stop reason: ALTCHOICE

## 2019-08-28 NOTE — PROGRESS NOTES
FAMILY [facility] HEALTH MAINTENANCE OFFICE VISIT  [facility] - [facility]    NAME: Ken Trejo  AGE: 37 y o  SEX: female  : 1985     DATE: 2019    Assessment and Plan     Problem List Items Addressed This Visit        Respiratory    Mild intermittent asthma without complication - Primary    Relevant Medications    albuterol (2 5 mg/3 mL) 0 083 % nebulizer solution    albuterol (PROAIR HFA) 90 mcg/act inhaler    fluticasone-salmeterol (ADVAIR DISKUS) 100-50 mcg/dose inhaler    montelukast (SINGULAIR) 10 mg tablet       Other    Class 3 severe obesity in adult [facility])    Relevant Orders    Lipid Panel with Direct LDL reflex    Comprehensive metabolic panel    Encounter for general adult medical examination with abnormal findings            · Patient Counseling:   · Nutrition: Stressed importance of a well balanced diet, moderation of sodium/saturated fat, caloric balance and sufficient intake of fiber  · Exercise: Stressed the importance of regular exercise with a goal of 150 minutes per week  · Dental Health: Discussed daily flossing and brushing and regular dental visits     · Immunizations reviewed: LK immunization CPE list: Up To Date  · Discussed benefits of:  Cervical Cancer screening   BMI Counseling: Body mass index is 41 54 kg/m²  Discussed with patient's BMI with her  The BMI is above average  BMI counseling and education was provided to the patient  Nutrition recommendations include reducing portion sizes, 3-5 servings of fruits/vegetables daily and reducing fast food intake  No follow-ups on file  Chief Complaint     Chief Complaint   Patient presents with    Annual Exam     est care       History of Present Illness     37year old female with mild intermittent asthma who presents for routine annual exam, no complaints today         Well Adult Physical   Patient here for a comprehensive physical exam       Diet and Physical Activity  Diet: well balanced diet  Exercise: rarely      Depression Screen  PHQ-9 Depression Screening    PHQ-9:    Frequency of the following problems over the past two weeks:       Little interest or pleasure in doing things:  0 - not at all  Feeling down, depressed, or hopeless:  0 - not at all  PHQ-2 Score:  0          General Health  Hearing: Normal:  bilateral  Vision: goes for regular eye exams  Dental: no dental visits for >1 year, brushes teeth twice daily and flosses teeth occasionally    Reproductive Health  Last pap: 2018 normal  Abnormal uterine bleeding: getting transvaginal US  LMP: 2019  Delivered last April  Sexually active, using condoms for contraception  No concern for STDs      The following portions of the patient's history were reviewed and updated as appropriate: allergies, current medications, past family history, past medical history, past social history, past surgical history and problem list     Review of Systems     Review of Systems   Constitutional: Negative for chills and fever  HENT: Negative for congestion  Respiratory: Negative for shortness of breath and wheezing  Gastrointestinal: Negative for abdominal pain, diarrhea, nausea and vomiting  Genitourinary: Negative for dysuria  Musculoskeletal: Negative for arthralgias  Skin: Negative for pallor and rash  Neurological: Negative for weakness and light-headedness  Psychiatric/Behavioral: Negative for suicidal ideas         Past Medical History     Past Medical History:   Diagnosis Date    Animal dander allergy     Asthma     with exacerbation last assessed 2015    Pregnancy     :2010 SAB-natural;  F IOL for oligo-meconium    Seasonal allergies     Spontaneous      last assessed oct 3 2016    Varicella     HX DISEASE    Visual impairment     eyewear        Past Surgical History     Past Surgical History:   Procedure Laterality Date    MOLE REMOVAL      on stomach       Social History Social History     Socioeconomic History    Marital status: /Civil Union     Spouse name: None    Number of children: None    Years of education: None    Highest education level: None   Occupational History    None   Social Needs    Financial resource strain: None    Food insecurity:     Worry: None     Inability: None    Transportation needs:     Medical: None     Non-medical: None   Tobacco Use    Smoking status: Never Smoker    Smokeless tobacco: Never Used   Substance and Sexual Activity    Alcohol use: No    Drug use: No    Sexual activity: Yes     Partners: Male     Birth control/protection: Condom Male   Lifestyle    Physical activity:     Days per week: None     Minutes per session: None    Stress: None   Relationships    Social connections:     Talks on phone: None     Gets together: None     Attends Druze service: None     Active member of club or organization: None     Attends meetings of clubs or organizations: None     Relationship status: None    Intimate partner violence:     Fear of current or ex partner: None     Emotionally abused: None     Physically abused: None     Forced sexual activity: None   Other Topics Concern    None   Social History Narrative    Domestic violence- denied       Family History     Family History   Problem Relation Age of Onset    No Known Problems Mother     Asthma Maternal Grandfather     Parkinsonism Other     Hypothyroidism Other     Thyroid disease Other        Current Medications       Current Outpatient Medications:     albuterol (2 5 mg/3 mL) 0 083 % nebulizer solution, Take 1 vial (2 5 mg total) by nebulization every 4 (four) hours as needed for wheezing or shortness of breath, Disp: 30 vial, Rfl: 3    albuterol (PROAIR HFA) 90 mcg/act inhaler, Inhale 1-2 puffs every 4 (four) hours as needed for wheezing or shortness of breath, Disp: 1 Inhaler, Rfl: 3    fluticasone-salmeterol (ADVAIR DISKUS) 100-50 mcg/dose inhaler, Inhale 1 puff 2 (two) times a day, Disp: 1 Inhaler, Rfl: 3    montelukast (SINGULAIR) 10 mg tablet, Take 1 tablet (10 mg total) by mouth daily at bedtime, Disp: 30 tablet, Rfl: 3     Allergies     Allergies   Allergen Reactions    Cat Hair Extract Allergic Rhinitis     Asthma flares up    Banana GI Intolerance    Other Allergic Rhinitis     Seasonal allergies        Objective     /74 (BP Location: Left arm, Patient Position: Sitting, Cuff Size: Large)   Pulse 70   Temp 97 6 °F (36 4 °C) (Tympanic)   Ht 5' 4" (1 626 m)   Wt 110 kg (242 lb)   SpO2 98%   BMI 41 54 kg/m²      Physical Exam   Constitutional: She is oriented to person, place, and time  She appears well-developed and well-nourished  No distress  HENT:   Head: Normocephalic and atraumatic  Right Ear: External ear normal    Left Ear: External ear normal    Nose: Nose normal    Mouth/Throat: Oropharynx is clear and moist  No oropharyngeal exudate  Eyes: Pupils are equal, round, and reactive to light  Conjunctivae and EOM are normal  Right eye exhibits no discharge  Left eye exhibits no discharge  No scleral icterus  Neck: Normal range of motion  Neck supple  No JVD present  No thyromegaly present  Cardiovascular: Normal rate, regular rhythm, normal heart sounds and intact distal pulses  Exam reveals no gallop and no friction rub  No murmur heard  Pulmonary/Chest: Effort normal and breath sounds normal  No respiratory distress  She has no wheezes  She has no rales  Abdominal: Soft  Bowel sounds are normal  She exhibits no distension  There is no tenderness  There is no rebound and no guarding  Musculoskeletal: She exhibits no edema  Lymphadenopathy:     She has no cervical adenopathy  Neurological: She is alert and oriented to person, place, and time  Skin: Skin is warm and dry  No rash noted  She is not diaphoretic  No pallor  Psychiatric: She has a normal mood and affect   Her behavior is normal          No exam data present        Issac Preciado MD  [address]

## 2019-09-17 LAB
ALBUMIN SERPL-MCNC: 4.2 G/DL (ref 3.5–5.5)
ALBUMIN/GLOB SERPL: 1.4 {RATIO} (ref 1.2–2.2)
ALP SERPL-CCNC: 48 IU/L (ref 39–117)
ALT SERPL-CCNC: 16 IU/L (ref 0–32)
AST SERPL-CCNC: 11 IU/L (ref 0–40)
BILIRUB SERPL-MCNC: <0.2 MG/DL (ref 0–1.2)
BUN SERPL-MCNC: 12 MG/DL (ref 6–20)
BUN/CREAT SERPL: 15 (ref 9–23)
CALCIUM SERPL-MCNC: 9.1 MG/DL (ref 8.7–10.2)
CHLORIDE SERPL-SCNC: 103 MMOL/L (ref 96–106)
CHOLEST SERPL-MCNC: 181 MG/DL (ref 100–199)
CO2 SERPL-SCNC: 22 MMOL/L (ref 20–29)
CREAT SERPL-MCNC: 0.82 MG/DL (ref 0.57–1)
GLOBULIN SER-MCNC: 2.9 G/DL (ref 1.5–4.5)
GLUCOSE SERPL-MCNC: 91 MG/DL (ref 65–99)
HDLC SERPL-MCNC: 28 MG/DL
LDLC SERPL CALC-MCNC: 130 MG/DL (ref 0–99)
POTASSIUM SERPL-SCNC: 4.3 MMOL/L (ref 3.5–5.2)
PROT SERPL-MCNC: 7.1 G/DL (ref 6–8.5)
SL AMB EGFR AFRICAN AMERICAN: 106 ML/MIN/1.73
SL AMB EGFR NON AFRICAN AMERICAN: 92 ML/MIN/1.73
SODIUM SERPL-SCNC: 138 MMOL/L (ref 134–144)
TRIGL SERPL-MCNC: 117 MG/DL (ref 0–149)

## 2019-09-26 ENCOUNTER — TELEPHONE (OUTPATIENT)
Dept: FAMILY MEDICINE CLINIC | Facility: CLINIC | Age: 34
End: 2019-09-26

## 2019-09-26 NOTE — TELEPHONE ENCOUNTER
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

## 2020-09-11 ENCOUNTER — OFFICE VISIT (OUTPATIENT)
Dept: FAMILY MEDICINE CLINIC | Facility: CLINIC | Age: 35
End: 2020-09-11
Payer: COMMERCIAL

## 2020-09-11 VITALS
TEMPERATURE: 97.2 F | BODY MASS INDEX: 44.05 KG/M2 | OXYGEN SATURATION: 97 % | HEIGHT: 64 IN | SYSTOLIC BLOOD PRESSURE: 152 MMHG | DIASTOLIC BLOOD PRESSURE: 106 MMHG | HEART RATE: 91 BPM | WEIGHT: 258 LBS

## 2020-09-11 DIAGNOSIS — J45.20 MILD INTERMITTENT ASTHMA WITHOUT COMPLICATION: ICD-10-CM

## 2020-09-11 DIAGNOSIS — Z00.00 ANNUAL PHYSICAL EXAM: ICD-10-CM

## 2020-09-11 PROBLEM — Z00.01 ENCOUNTER FOR GENERAL ADULT MEDICAL EXAMINATION WITH ABNORMAL FINDINGS: Status: RESOLVED | Noted: 2019-08-28 | Resolved: 2020-09-11

## 2020-09-11 PROBLEM — E66.01 CLASS 3 SEVERE OBESITY IN ADULT (HCC): Status: RESOLVED | Noted: 2019-08-28 | Resolved: 2020-09-11

## 2020-09-11 PROBLEM — Z01.419 GYNECOLOGIC EXAM NORMAL: Status: RESOLVED | Noted: 2018-09-04 | Resolved: 2020-09-11

## 2020-09-11 PROBLEM — E66.813 CLASS 3 SEVERE OBESITY IN ADULT (HCC): Status: RESOLVED | Noted: 2019-08-28 | Resolved: 2020-09-11

## 2020-09-11 PROBLEM — N93.9 VAGINAL BLEEDING: Status: RESOLVED | Noted: 2018-06-01 | Resolved: 2020-09-11

## 2020-09-11 PROBLEM — Z34.83 PRENATAL CARE, SUBSEQUENT PREGNANCY, THIRD TRIMESTER: Status: RESOLVED | Noted: 2018-01-31 | Resolved: 2020-09-11

## 2020-09-11 PROBLEM — E66.01 MATERNAL MORBID OBESITY, ANTEPARTUM, THIRD TRIMESTER (HCC): Status: RESOLVED | Noted: 2018-03-12 | Resolved: 2020-09-11

## 2020-09-11 PROBLEM — O99.213 MATERNAL MORBID OBESITY, ANTEPARTUM, THIRD TRIMESTER (HCC): Status: RESOLVED | Noted: 2018-03-12 | Resolved: 2020-09-11

## 2020-09-11 PROBLEM — Z3A.40 40 WEEKS GESTATION OF PREGNANCY: Status: RESOLVED | Noted: 2018-03-12 | Resolved: 2020-09-11

## 2020-09-11 PROCEDURE — 3725F SCREEN DEPRESSION PERFORMED: CPT | Performed by: FAMILY MEDICINE

## 2020-09-11 PROCEDURE — 1036F TOBACCO NON-USER: CPT | Performed by: FAMILY MEDICINE

## 2020-09-11 PROCEDURE — 99213 OFFICE O/P EST LOW 20 MIN: CPT | Performed by: FAMILY MEDICINE

## 2020-09-11 RX ORDER — ALBUTEROL SULFATE 90 UG/1
2 AEROSOL, METERED RESPIRATORY (INHALATION) EVERY 6 HOURS PRN
Qty: 1 INHALER | Refills: 5 | Status: SHIPPED | OUTPATIENT
Start: 2020-09-11

## 2020-09-11 RX ORDER — MONTELUKAST SODIUM 10 MG/1
10 TABLET ORAL
Qty: 30 TABLET | Refills: 3 | Status: SHIPPED | OUTPATIENT
Start: 2020-09-11

## 2020-09-11 NOTE — PROGRESS NOTES
[address]    NAME: Joni Puente  AGE: 38 y o  SEX: female  : 1985     DATE: 2020     Assessment and Plan:     Problem List Items Addressed This Visit        Respiratory    Mild intermittent asthma without complication    Relevant Medications    albuterol (PROVENTIL HFA,VENTOLIN HFA) 90 mcg/act inhaler    fluticasone-salmeterol (Advair Diskus) 100-50 mcg/dose inhaler    montelukast (SINGULAIR) 10 mg tablet       Other    BMI 40 0-44 9, adult ([facility] [address] ) - Primary          Patient recently completed bloodwork through work  States that all labs were normal  Will file paperwork to share with our office  Immunizations and preventive care screenings were discussed with patient today  Appropriate education was printed on patient's after visit summary  Counseling:  Alcohol/drug use: discussed moderation in alcohol intake, the recommendations for healthy alcohol use, and avoidance of illicit drug use  Dental Health: discussed importance of regular tooth brushing, flossing, and dental visits  Injury prevention: discussed safety/seat belts, safety helmets, smoke detectors, carbon dioxide detectors, and smoking near bedding or upholstery  Sexual health: discussed sexually transmitted diseases, partner selection, use of condoms, avoidance of unintended pregnancy, and contraceptive alternatives  · Exercise: the importance of regular exercise/physical activity was discussed  Recommend exercise 3-5 times per week for at least 30 minutes  BMI Counseling: Body mass index is 44 29 kg/m²  The BMI is above normal  Exercise recommendations include moderate physical activity 150 minutes/week  No pharmacotherapy was ordered  Depression Screening and Follow-up Plan: Patient's depression screening was positive with a PHQ-2 score of 0  Clincally patient does not have depression  No treatment is required  No follow-ups on file  Chief Complaint:     Chief Complaint   Patient presents with    Physical Exam      History of Present Illness:     Adult Annual Physical   Patient here for a comprehensive physical exam  The patient reports no problems  Diet and Physical Activity  · Diet/Nutrition: well balanced diet  · Exercise: no formal exercise  Depression Screening  PHQ-9 Depression Screening    PHQ-9:    Frequency of the following problems over the past two weeks:       Little interest or pleasure in doing things:  0 - not at all  Feeling down, depressed, or hopeless:  0 - not at all  PHQ-2 Score:  0       General Health  · Sleep: sleeps well  · Hearing: normal - bilateral   · Vision: no vision problems  · Dental: regular dental visits  /GYN Health  · Last menstrual period: 22 days ago  · Contraceptive method: none  · History of STDs?: no      Review of Systems:     Review of Systems   Constitutional: Negative for activity change, fatigue and fever  Respiratory: Negative  Cardiovascular: Negative  Gastrointestinal: Negative  Genitourinary: Negative  Musculoskeletal: Negative         Past Medical History:     Past Medical History:   Diagnosis Date    Animal dander allergy     Asthma     with exacerbation last assessed 2015    Pregnancy     :2010 SAB-natural;  F IOL for oligo-meconium    Seasonal allergies     Spontaneous      last assessed oct 3 2016    Varicella     HX DISEASE    Visual impairment     eyewear       Past Surgical History:     Past Surgical History:   Procedure Laterality Date    MOLE REMOVAL      on stomach      Social History:        Social History     Socioeconomic History    Marital status: /Civil Union     Spouse name: None    Number of children: None    Years of education: None    Highest education level: None   Occupational History    None   Social Needs    Financial resource strain: None    Food insecurity     Worry: None Inability: None    Transportation needs     Medical: None     Non-medical: None   Tobacco Use    Smoking status: Never Smoker    Smokeless tobacco: Never Used   Substance and Sexual Activity    Alcohol use: No    Drug use: No    Sexual activity: Yes     Partners: Male     Birth control/protection: Condom Male   Lifestyle    Physical activity     Days per week: None     Minutes per session: None    Stress: None   Relationships    Social connections     Talks on phone: None     Gets together: None     Attends Yazidi service: None     Active member of club or organization: None     Attends meetings of clubs or organizations: None     Relationship status: None    Intimate partner violence     Fear of current or ex partner: None     Emotionally abused: None     Physically abused: None     Forced sexual activity: None   Other Topics Concern    None   Social History Narrative    Domestic violence- denied      Family History:     Family History   Problem Relation Age of Onset    No Known Problems Mother     Asthma Maternal Grandfather     Parkinsonism Other     Hypothyroidism Other     Thyroid disease Other       Current Medications:     Current Outpatient Medications   Medication Sig Dispense Refill    fluticasone-salmeterol (Advair Diskus) 100-50 mcg/dose inhaler Inhale 1 puff 2 (two) times a day 1 Inhaler 3    montelukast (SINGULAIR) 10 mg tablet Take 1 tablet (10 mg total) by mouth daily at bedtime 30 tablet 3    albuterol (PROVENTIL HFA,VENTOLIN HFA) 90 mcg/act inhaler Inhale 2 puffs every 6 (six) hours as needed for wheezing or shortness of breath 1 Inhaler 5     No current facility-administered medications for this visit  Allergies:      Allergies   Allergen Reactions    Cat Hair Extract Allergic Rhinitis     Asthma flares up    Banana GI Intolerance    Other Allergic Rhinitis     Seasonal allergies       Physical Exam:     BP (!) 152/106 (BP Location: Right arm, Patient Position: Sitting, Cuff Size: Adult)   Pulse 91   Temp (!) 97 2 °F (36 2 °C) (Tympanic)   Ht 5' 4" (1 626 m)   Wt 117 kg (258 lb)   LMP 08/20/2020   SpO2 97%   BMI 44 29 kg/m²     Physical Exam  Vitals signs reviewed  Constitutional:       Appearance: She is obese  HENT:      Head: Normocephalic and atraumatic  Right Ear: Tympanic membrane, ear canal and external ear normal       Left Ear: Tympanic membrane, ear canal and external ear normal       Nose: Nose normal  No congestion  Mouth/Throat:      Mouth: Mucous membranes are moist       Pharynx: Oropharynx is clear  Eyes:      Extraocular Movements: Extraocular movements intact  Conjunctiva/sclera: Conjunctivae normal       Pupils: Pupils are equal, round, and reactive to light  Neck:      Musculoskeletal: Normal range of motion and neck supple  Cardiovascular:      Rate and Rhythm: Normal rate and regular rhythm  Pulses: Normal pulses  Heart sounds: Normal heart sounds  Pulmonary:      Effort: Pulmonary effort is normal       Breath sounds: Normal breath sounds  Abdominal:      General: Abdomen is flat  Bowel sounds are normal       Palpations: Abdomen is soft  Musculoskeletal: Normal range of motion  Skin:     General: Skin is warm and dry  Capillary Refill: Capillary refill takes less than 2 seconds  Neurological:      General: No focal deficit present  Mental Status: She is alert     Psychiatric:         Mood and Affect: Mood normal          Behavior: Behavior normal           Bautista Aguillon MD   [address] [place] [address]

## 2020-09-11 NOTE — PATIENT INSTRUCTIONS

## 2021-03-10 DIAGNOSIS — Z23 ENCOUNTER FOR IMMUNIZATION: ICD-10-CM

## 2021-03-13 ENCOUNTER — IMMUNIZATIONS (OUTPATIENT)
Dept: FAMILY MEDICINE CLINIC | Facility: HOSPITAL | Age: 36
End: 2021-03-13

## 2021-03-13 DIAGNOSIS — Z23 ENCOUNTER FOR IMMUNIZATION: Primary | ICD-10-CM

## 2021-03-13 PROCEDURE — 91300 SARS-COV-2 / COVID-19 MRNA VACCINE (PFIZER-BIONTECH) 30 MCG: CPT

## 2021-03-13 PROCEDURE — 0001A SARS-COV-2 / COVID-19 MRNA VACCINE (PFIZER-BIONTECH) 30 MCG: CPT

## 2021-04-05 ENCOUNTER — IMMUNIZATIONS (OUTPATIENT)
Dept: FAMILY MEDICINE CLINIC | Facility: HOSPITAL | Age: 36
End: 2021-04-05

## 2021-04-05 DIAGNOSIS — Z23 ENCOUNTER FOR IMMUNIZATION: Primary | ICD-10-CM

## 2021-04-05 PROCEDURE — 0002A SARS-COV-2 / COVID-19 MRNA VACCINE (PFIZER-BIONTECH) 30 MCG: CPT

## 2021-04-05 PROCEDURE — 91300 SARS-COV-2 / COVID-19 MRNA VACCINE (PFIZER-BIONTECH) 30 MCG: CPT

## 2022-07-20 NOTE — PROGRESS NOTES
Assessment/Plan:    Abnormal uterine bleeding, postpartum  Normal exam today  Pt to continue to watch bleeding, should be stopping if does not stop or bleeding becomes heavier or passing clots again patient to call office  Most likely has passed everything at this point  Reviewed pelvic ultrasound results, was recommended for patient to have MRI, pt declined, but did agree to a follow up ultrasound  Rx for follow-up pelvic ultrasound given  RTO for annual exam       Diagnoses and all orders for this visit:    Ovarian mass, right  -     US pelvis complete w transvaginal; Future    Abnormal uterine bleeding, postpartum          Subjective:      Patient ID: Itzel Nesbitt is a 26 y o  female  Pt had reported to the ER on 6/1/18 for heavy bleeding and clots and passing what she thought was products of conception as she was 7 weeks postpartum  Pt states her bleeding up until that day had been minimal  She was wearing a panti-liner that would last all day  Then on 6/1/18 she was shopping and stood up and felt a huge gush of blood and something come out vaginally  She bled through her underwear and pants  She then headed home and bleeding continued at which point she had called an ambulance to take her to the ER for evaluation  In the ER it was determined it was most likely retained products of conception  Pt was given Methergine PO to take to help remove any other retained products  A pelvic ultrasound was done which showed a thickened endometrium  Within the right ovary there is a hyperechoic nodule, measuring 1 5 x 1 3 x 1 5 cm, containing internal vascularity  Small 1 2 x 1 1 x 0 9 cm paraovarian cyst noted laterally to the ovary  Pt states since the ER visit she had heavy bleeding for a few more days, but has since been tapering off  Pt denies noticing any more blood clots or "products of conception"  Pt denies any fever, chills  Denies any back or pelvic pain     Denies any abnormal vaginal discharge, What Type Of Note Output Would You Prefer (Optional)?: Bullet Format What Is The Reason For Today's Visit?: Full Body Skin Examination itching, or odor  Has not had intercourse since delivery  Denies any bowel or bladder issues  Pt is currently breastfeeding with no difficulties  The following portions of the patient's history were reviewed and updated as appropriate: allergies, current medications, past family history, past medical history, past social history, past surgical history and problem list     Review of Systems   All other systems reviewed and are negative  Objective:      /82 (BP Location: Left arm, Patient Position: Sitting, Cuff Size: Large)   Ht 5' 4" (1 626 m)   Wt 105 kg (231 lb)   LMP 07/02/2017 (Exact Date)   BMI 39 65 kg/m²          Physical Exam   Constitutional: She is oriented to person, place, and time  She appears well-developed and well-nourished  HENT:   Head: Normocephalic and atraumatic  Neck: Normal range of motion  Neck supple  No thyromegaly present  Cardiovascular: Normal rate, regular rhythm and normal heart sounds  Pulmonary/Chest: Effort normal and breath sounds normal    Abdominal: Soft  Bowel sounds are normal  She exhibits no distension and no mass  There is no tenderness  There is no rebound and no guarding  Genitourinary: Uterus normal  No labial fusion  There is no rash, tenderness, lesion or injury on the right labia  There is no rash, tenderness, lesion or injury on the left labia  Cervix exhibits no motion tenderness, no discharge and no friability  Right adnexum displays no mass, no tenderness and no fullness  Left adnexum displays no mass, no tenderness and no fullness  There is bleeding (small amount of red blood noted in vaginal vault) in the vagina  Musculoskeletal: Normal range of motion  Neurological: She is alert and oriented to person, place, and time  Skin: Skin is warm and dry  Psychiatric: She has a normal mood and affect   Her behavior is normal  Judgment and thought content normal  What Is The Reason For Today's Visit? (Being Monitored For X): concerning skin lesions on an annual basis

## 2022-08-30 ENCOUNTER — TELEPHONE (OUTPATIENT)
Dept: FAMILY MEDICINE CLINIC | Facility: CLINIC | Age: 37
End: 2022-08-30

## 2023-07-27 ENCOUNTER — OFFICE VISIT (OUTPATIENT)
Dept: FAMILY MEDICINE CLINIC | Facility: CLINIC | Age: 38
End: 2023-07-27
Payer: COMMERCIAL

## 2023-07-27 VITALS
HEIGHT: 63 IN | BODY MASS INDEX: 38.88 KG/M2 | HEART RATE: 102 BPM | RESPIRATION RATE: 18 BRPM | SYSTOLIC BLOOD PRESSURE: 132 MMHG | DIASTOLIC BLOOD PRESSURE: 78 MMHG | WEIGHT: 219.4 LBS | TEMPERATURE: 97.5 F

## 2023-07-27 DIAGNOSIS — Z12.31 ENCOUNTER FOR SCREENING MAMMOGRAM FOR BREAST CANCER: ICD-10-CM

## 2023-07-27 DIAGNOSIS — R06.83 SNORING: ICD-10-CM

## 2023-07-27 DIAGNOSIS — Z00.00 WELL ADULT HEALTH CHECK: Primary | ICD-10-CM

## 2023-07-27 DIAGNOSIS — R53.82 CHRONIC FATIGUE: ICD-10-CM

## 2023-07-27 DIAGNOSIS — Z23 NEED FOR VACCINATION: ICD-10-CM

## 2023-07-27 DIAGNOSIS — J45.20 MILD INTERMITTENT ASTHMA WITHOUT COMPLICATION: ICD-10-CM

## 2023-07-27 DIAGNOSIS — I10 HYPERTENSION, UNSPECIFIED TYPE: ICD-10-CM

## 2023-07-27 PROBLEM — O09.523 ELDERLY MULTIGRAVIDA IN THIRD TRIMESTER: Status: RESOLVED | Noted: 2017-11-28 | Resolved: 2023-07-27

## 2023-07-27 PROCEDURE — 99396 PREV VISIT EST AGE 40-64: CPT | Performed by: FAMILY MEDICINE

## 2023-07-27 PROCEDURE — 90715 TDAP VACCINE 7 YRS/> IM: CPT

## 2023-07-27 PROCEDURE — 90471 IMMUNIZATION ADMIN: CPT

## 2023-07-27 RX ORDER — ALBUTEROL SULFATE 90 UG/1
2 AEROSOL, METERED RESPIRATORY (INHALATION) EVERY 6 HOURS PRN
Qty: 18 G | Refills: 1 | Status: SHIPPED | OUTPATIENT
Start: 2023-07-27

## 2023-07-27 RX ORDER — MONTELUKAST SODIUM 10 MG/1
10 TABLET ORAL
Qty: 90 TABLET | Refills: 1 | Status: SHIPPED | OUTPATIENT
Start: 2023-07-27

## 2023-07-27 RX ORDER — FLUTICASONE PROPIONATE AND SALMETEROL 100; 50 UG/1; UG/1
1 POWDER RESPIRATORY (INHALATION) 2 TIMES DAILY
Qty: 60 BLISTER | Refills: 1 | Status: SHIPPED | OUTPATIENT
Start: 2023-07-27

## 2023-07-27 RX ORDER — CHLORTHALIDONE 25 MG/1
12.5 TABLET ORAL DAILY
Qty: 90 TABLET | Refills: 0 | Status: SHIPPED | OUTPATIENT
Start: 2023-07-27 | End: 2024-01-23

## 2023-07-27 RX ORDER — CHLORTHALIDONE 25 MG/1
12.5 TABLET ORAL DAILY
COMMUNITY
Start: 2022-09-23 | End: 2023-07-27 | Stop reason: SDUPTHER

## 2023-07-27 NOTE — PROGRESS NOTES
Depression Screening and Follow-up Plan: Patient was screened for depression during today's encounter. They screened negative with a PHQ-2 score of 0.    FAMILY PRACTICE HEALTH MAINTENANCE OFFICE VISIT  [facility] - [facility]    NAME: Dorys Kevin  AGE: 34 y.o. SEX: female  : 1985     DATE: 2023    Assessment and Plan     1. Well adult health check    2. Mild intermittent asthma without complication  Comments:  Controlled. Orders:  -     albuterol (PROVENTIL HFA,VENTOLIN HFA) 90 mcg/act inhaler; Inhale 2 puffs every 6 (six) hours as needed for wheezing or shortness of breath  -     Fluticasone-Salmeterol (Advair Diskus) 100-50 mcg/dose inhaler; Inhale 1 puff 2 (two) times a day  -     montelukast (SINGULAIR) 10 mg tablet; Take 1 tablet (10 mg total) by mouth daily at bedtime    3. Hypertension, unspecified type  Comments:  Controlled. Orders:  -     chlorthalidone 25 mg tablet; Take 0.5 tablets (12.5 mg total) by mouth daily    4. Snoring  -     Ambulatory Referral to Sleep Medicine; Future    5. Encounter for screening mammogram for breast cancer  -     Mammo screening bilateral w 3d & cad; Future; Expected date: 2023    6. Need for vaccination  -     TDAP VACCINE GREATER THAN OR EQUAL TO 6YO IM    7. Chronic fatigue  -     TSH, 3rd generation with Free T4 reflex; Future  -     Comprehensive metabolic panel; Future  -     CBC and differential; Future  -     Vitamin D 25 hydroxy; Future  -     Vitamin B12; Future        Patient Counseling:   Nutrition: Stressed importance of a well balanced diet, moderation of sodium/saturated fat, caloric balance and sufficient intake of fiber  Exercise: Stressed the importance of regular exercise with a goal of 150 minutes per week  Dental Health: Discussed daily flossing and brushing and regular dental visits   Immunizations reviewed: See Orders  Discussed benefits of:  Mammogram .  BMI Counseling:  Body mass index is 38.86 kg/m². Discussed with patient's BMI with her. The BMI is above normal. Nutrition recommendations include reducing portion sizes, decreasing overall calorie intake, 3-5 servings of fruits/vegetables daily, reducing fast food intake, consuming healthier snacks and decreasing soda and/or juice intake. Exercise recommendations include moderate aerobic physical activity for 150 minutes/week. No follow-ups on file. Chief Complaint     Chief Complaint   Patient presents with   • Employment Physical     [email or web address]   • Establish Care       History of Present Illness     HPI    Well Adult Physical   Patient here for a comprehensive physical exam.      Diet and Physical Activity  Diet: well balanced diet  Exercise: daily      Depression Screen  PHQ-2/9 Depression Screening    Little interest or pleasure in doing things: 0 - not at all  Feeling down, depressed, or hopeless: 0 - not at all  Trouble falling or staying asleep, or sleeping too much: 2 - more than half the days  Feeling tired or having little energy: 3 - nearly every day  Poor appetite or overeatin - not at all  Feeling bad about yourself - or that you are a failure or have let yourself or your family down: 0 - not at all  Trouble concentrating on things, such as reading the newspaper or watching television: 0 - not at all  Moving or speaking so slowly that other people could have noticed.  Or the opposite - being so fidgety or restless that you have been moving around a lot more than usual: 0 - not at all  Thoughts that you would be better off dead, or of hurting yourself in some way: 0 - not at all  PHQ-2 Score: 0  PHQ-2 Interpretation: Negative depression screen  PHQ-9 Score: 5   PHQ-9 Interpretation: Mild depression           General Health  Hearing: Normal:  bilateral  Vision: no vision problems, goes for regular eye exams and wears glasses  Dental: no dental visits for >1 year, brushes teeth twice daily and flosses teeth occasionally    Reproductive Health  No issues , Regular Periods and Follows with gynecologist      The following portions of the patient's history were reviewed and updated as appropriate: allergies, current medications, past family history, past medical history, past social history, past surgical history and problem list.    Review of Systems     Review of Systems   Constitutional: Negative. HENT: Negative. Eyes: Negative. Respiratory: Negative. Cardiovascular: Negative. Gastrointestinal: Negative. Endocrine: Negative. Genitourinary: Negative. Musculoskeletal: Negative. Skin: Negative. Allergic/Immunologic: Negative. Neurological: Negative. Hematological: Negative. Psychiatric/Behavioral: Negative.         Past Medical History     Past Medical History:   Diagnosis Date   • Animal dander allergy    • Asthma     with exacerbation last assessed 2015   • Pregnancy     : SAB-natural;  F IOL for oligo-meconium   • Seasonal allergies    • Spontaneous      last assessed oct 3 2016   • Varicella     HX DISEASE   • Visual impairment     eyewear        Past Surgical History     Past Surgical History:   Procedure Laterality Date   • MOLE REMOVAL      on stomach       Social History     Social History     Socioeconomic History   • Marital status: /Civil Union     Spouse name: None   • Number of children: None   • Years of education: None   • Highest education level: None   Occupational History   • None   Tobacco Use   • Smoking status: Never   • Smokeless tobacco: Never   Vaping Use   • Vaping Use: Never used   Substance and Sexual Activity   • Alcohol use: No   • Drug use: No   • Sexual activity: Yes     Partners: Male     Birth control/protection: Condom Male   Other Topics Concern   • None   Social History Narrative    Domestic violence- denied     Social Determinants of Health     Financial Resource Strain: Not on file   Food Insecurity: Not on file   Transportation Needs: Not on file   Physical Activity: Not on file   Stress: Not on file   Social Connections: Not on file   Intimate Partner Violence: Not on file   Housing Stability: Not on file       Family History     Family History   Problem Relation Age of Onset   • No Known Problems Mother    • Asthma Maternal Grandfather    • Parkinsonism Other    • Hypothyroidism Other    • Thyroid disease Other        Current Medications       Current Outpatient Medications:   •  albuterol (PROVENTIL HFA,VENTOLIN HFA) 90 mcg/act inhaler, Inhale 2 puffs every 6 (six) hours as needed for wheezing or shortness of breath, Disp: 18 g, Rfl: 1  •  chlorthalidone 25 mg tablet, Take 0.5 tablets (12.5 mg total) by mouth daily, Disp: 90 tablet, Rfl: 0  •  Fluticasone-Salmeterol (Advair Diskus) 100-50 mcg/dose inhaler, Inhale 1 puff 2 (two) times a day, Disp: 60 blister, Rfl: 1  •  montelukast (SINGULAIR) 10 mg tablet, Take 1 tablet (10 mg total) by mouth daily at bedtime, Disp: 90 tablet, Rfl: 1     Allergies     Allergies   Allergen Reactions   • Cat Hair Extract Allergic Rhinitis     Asthma flares up   • Avocado - Food Allergy GI Intolerance   • Banana - Food Allergy GI Intolerance   • Other Allergic Rhinitis     Seasonal allergies        Objective     /78   Pulse 102   Temp 97.5 °F (36.4 °C)   Resp 18   Ht 5' 3" (1.6 m)   Wt 99.5 kg (219 lb 6.4 oz)   LMP 07/14/2023 (Approximate)   BMI 38.86 kg/m²      Physical Exam  Constitutional:       General: She is not in acute distress. Appearance: Normal appearance. She is well-developed. She is not diaphoretic. HENT:      Head: Normocephalic and atraumatic. Right Ear: Tympanic membrane, ear canal and external ear normal. There is no impacted cerumen. Left Ear: Tympanic membrane, ear canal and external ear normal. There is no impacted cerumen. Eyes:      General: No scleral icterus. Right eye: No discharge. Left eye: No discharge.       Extraocular Movements: Extraocular movements intact. Conjunctiva/sclera: Conjunctivae normal.      Pupils: Pupils are equal, round, and reactive to light. Cardiovascular:      Rate and Rhythm: Normal rate and regular rhythm. Heart sounds: Normal heart sounds. No murmur heard. No friction rub. No gallop. Pulmonary:      Effort: Pulmonary effort is normal. No respiratory distress. Breath sounds: Normal breath sounds. No wheezing or rales. Chest:      Chest wall: No tenderness. Abdominal:      General: Bowel sounds are normal. There is no distension. Palpations: Abdomen is soft. There is no mass. Tenderness: There is no abdominal tenderness. There is no guarding or rebound. Musculoskeletal:         General: No deformity. Normal range of motion. Cervical back: Normal range of motion and neck supple. Skin:     General: Skin is warm and dry. Findings: No erythema or rash. Neurological:      Mental Status: She is alert and oriented to person, place, and time. Psychiatric:         Behavior: Behavior normal.         Thought Content:  Thought content normal.         Judgment: Judgment normal.           Vision Screening    Right eye Left eye Both eyes   Without correction      With correction 20/15 20/15 20/13           MD [facility]

## 2023-09-25 ENCOUNTER — OFFICE VISIT (OUTPATIENT)
Age: 38
End: 2023-09-25
Payer: COMMERCIAL

## 2023-09-25 VITALS
DIASTOLIC BLOOD PRESSURE: 76 MMHG | BODY MASS INDEX: 39.51 KG/M2 | OXYGEN SATURATION: 98 % | WEIGHT: 223 LBS | HEART RATE: 80 BPM | HEIGHT: 63 IN | SYSTOLIC BLOOD PRESSURE: 124 MMHG

## 2023-09-25 DIAGNOSIS — G47.19 EXCESSIVE DAYTIME SLEEPINESS: Primary | ICD-10-CM

## 2023-09-25 DIAGNOSIS — R06.83 SNORING: ICD-10-CM

## 2023-09-25 DIAGNOSIS — I10 HYPERTENSION, UNSPECIFIED TYPE: ICD-10-CM

## 2023-09-25 PROCEDURE — 99204 OFFICE O/P NEW MOD 45 MIN: CPT | Performed by: INTERNAL MEDICINE

## 2023-09-25 NOTE — PATIENT INSTRUCTIONS
Nursing Support:  When: Monday through Friday 7A-5PM except holidays  Where: Our direct line is [phone]. If you are having a true emergency please call 911. In the event that the line is busy or it is after hours please leave a voice message and we will return your call. Please speak clearly, leaving your full name, birth date, best number to reach you and the reason for your call. Medication refills: We will need the name of the medication, the dosage, the ordering provider, whether you get a 30 or 90 day refill, and the pharmacy name and address. Medications will be ordered by the provider only. Nurses cannot call in prescriptions. Please allow 7 days for medication refills. Physician requested updates: If your provider requested that you call with an update after starting medication, please be ready to provide us the medication and dosage, what time you take your medication, the time you attempt to fall asleep, time you fall asleep, when you wake up, and what time you get out of bed. Sleep Study Results: We will contact you with sleep study results and/or next steps after the physician has reviewed your testing. HOW TO CLEAN YOUR AUTO CPAP MACHINE    Mask: Clean the cushion of your mask daily. Dish soap and warm water. (Usually eligible for mask cushions every 3 months)    Headgear: Once a week. I find when you wash it daily it eats the material of the Velcro faster.  (Usually eligible for new headgear every 6 months)    Heated Tubing: Clean the tube every 3-7 days. One drop of dish soap run warm water through the tube then hang it over your shower curtain and let it drip dry. (Usually eligible every 3 months)    Humidifier: Rinse out every 1-3 days. Dish soap warm water or , top shelf. (eligible every 6 months) **DISTILLED WATER ONLY**    Filters:  Dark blue (reusable for 6 months)- Rinse under warm water (no soap) sit and drip dry every 2-3 weeks.   (eligible for a new one every 6 months)    Light Blue (disposable) throw away every 2-4 weeks depending on how dirty it looks. (eligible for 6 every 3 months)    Check with your insurance and durable medical equipment company regarding supply replacements. Sleep hygiene tips:    Keep a consistent bedtime and wake up time. This will lead to a more regular sleep schedule and avoid periods of sleep deprivation or periods of extended wakefulness during the night. Avoid watching TV in bed. If needing a form of media to help with sleep - can try sleep aid podcasts such Sleep With Me - a free podcast that helps with sleep initiation    Avoid napping, especially naps lasting longer than 1 hour or naps late in the day, which will likely affect your ability to fall asleep that night. Limit caffeine, avoiding caffeine after lunch to allow it to get out of your system and not affect your ability to fall asleep or the quality of your sleep. I usually recommend avoiding caffeine at least 6 hours before bedstime    Limit alcohol, alcohol can be sedating but also activating as it metabolizes, causing you to awaken from sleep earlier than desired. It can affect the quality of your sleep by not letting you get into the more refreshing stages of sleep. Avoid nicotine, of course not smoking or vaping at all is best, but nicotine is a stimulant and should be avoided near bedtime and during the night    Exercise and daytime physical activity is encouraged, in particular 4-6 hours before bedtime, as this may help you to fall asleep more easily and quality of sleep is improved. Rigorous exercise within 3 hours of bedtime is discouraged. Keep the sleep environment quiet and dark - Noise and light exposure during the night can disrupt sleep. White noise or ear plugs are often recommended to reduce noise. Using black out shades or an eye mask is commonly recommended to reduce light.   This also includes avoiding exposure to television or technology near bedtime, as this can have an impact on circadian rhythms by shifting sleep time later. Bedroom clock - Avoid checking the time at night  This includes alarm clocks and other time pieces such as watches and phones. Checking the time increases cognitive arousal and prolongs wakefulness. Evening eating - Avoid a large meal near bedtime, but don't go to bed hungry. Eat a healthy and filling meal in the evening without over-eating and avoid late night snacks. Insomnia tips: With great difficulty falling asleep or with difficulty falling back asleep, laying in bed for a prolonged period time is not ideal.  This can increase worry and rumination (having racing thoughts, not able to "turn off your brain". After what feels like 15 minutes, if you feel wide awake, worried, anxious, or can't clear your brain, it is better to leave the bedroom to do something relaxing , The typical recommendation is to read a boring book in dim light. In general, if you leave the room, you want to do something that is relaxing, not stimulating. Avoid bright screens, doing work, doing chores, or anything that requires a lot of mental effort. Return to bed when you feel more calm, sleepy, or mentally clear. It is common in insomnia to look at the time at night to see what time it is, how long you have been awake, etc.  However, this can negatively affect sleep. I strongly recommend avoiding looking at the time at night. Here are some ways to do this  1) Turn the clock around so you cannot see the time at night  2) Avoid wearing a watch to bed. 3) Leave your phone on the other side of the room so you cannot look at it at night  4) Set an alarm if you need to wake up in he morning. If you have not heard the alarm, assume it is still time to sleep. If you practice this consistently, sleep quality and anxiety regarding sleep may improve. There are some on-line resources that do require a fee that can be of help. Some of which will require a fee. https://conde.info/. [place].gov/apps/insomnia/index.html#dashboard (FREE)  Http://[employer]/cbt-online-insomnia-treatment.html  Ind[employer]The[employer].si    Some apps that have helped people sleep: Insomnia  (FREE)  CBT-I   Go! To Sleep by the [facility] [employer]     It is very important to avoid driving while drowsy, this can be very dangerous or even cause serious injury or death. If sleepy, it is not safe to get behind the wheel. If you are driving and feels sleepy, it is very important to pull over right away. Even losing control of the car for a split second can be deadly. If you feel you cannot control when sleepiness occurs and cannot prevented, it is important to not drive at all until this improves. Please let me know if you experience this as it is very important.

## 2023-09-25 NOTE — PROGRESS NOTES
Sleep Consultation   Sarah Jarvis 34 y.o. female MRN: [id number]      Reason for consultation: Evaluation and management of suspected TORIBIO    Requesting physician: Dr. Susanne Gallardo    Assessment/Plan  Sarah Jarvis is a 61-year-old woman with PMH of snoring, chronic fatigue, EDS, obesity, HTN, mild intermittent asthma (well-controlled), seasonal allergies, and chronic LBP, who presents to the sleep medicine clinic for further evaluation and management of suspected TORIBIO. No prior sleep study. Clinical history (snoring, witnessed apnea, and EDS), examination (Mallampati class - IV), and STOP BANG of 5 concerning for moderate-severe TORIBIO. Educated the patient regarding the pathophysiology of TORIBIO. Discussed with patient the anticipated short-term (decreased snoring, nocturnal awakenings, and EDS) and long-term (decreased stroke, MI, and dementia risk) benefits of TORIBIO treatment. Ordered a home sleep study. If study shows mild TORIBIO will treat with auto PAP (5-15 cm H2O, via N30i mask) given EDS. Recommend weight loss in conjunction with PCP. EDS likely secondary to untreated TORIBIO. Encouraged patient to obtain labs ordered by PCP (TSH, free T4, CMP, CBC, vitamin D, and vitamin B12) to further evaluate the etiology of her EDS. Ordered an iron profile serum lab to complete the evaluation for secondary causes of EDS. Follow-up in 1-3 months after sleep study and initiation of PAP therapy (compliance check). Suspected moderate-severe TORIBIO  -No prior sleep study  -STOP BANG of 5  -Clinical history (snoring, witnessed apnea, and EDS), examination (Mallampati class - IV), and STOP BANG of 5 concerning for moderate-severe TORIBIO  -Ordered a home sleep study. If study shows mild TORIBIO will treat with auto PAP (5-15 cm H2O, via N30i mask) given EDS  -Recommend weight loss in conjunction with PCP    EDS  -Goode of 14  -Likely secondary to untreated EDS.  No symptoms of narcolepsy  -Encouraged patient to obtain labs ordered by PCP (TSH, free T4, CMP, CBC, vitamin D, and vitamin B12) to further evaluate the etiology of her EDS  -Ordered an iron profile serum lab to complete the evaluation for secondary causes of EDS    Follow-up in 1-3 months after sleep study and initiation of PAP therapy (compliance check)    Staffed and seen with Dr. Zarina Escamilla on 9/25/2023    History of Present Illness   Berta Hart is a 42-year-old woman with PMH of snoring, chronic fatigue, EDS, obesity, HTN, mild intermittent asthma (well-controlled), seasonal allergies, and chronic LBP, who presents to the sleep medicine clinic for further evaluation and management of suspected TORIBIO. Unaccompanied in the clinic today. Referred by PCP given clinical concern for TORIBIO. No prior sleep study. Lost ~34 lbs in the past year with diet and exercise. Follows with PCP and cardiology. PCP ordered evaluation for chronic fatigue (TSH, free T4, CMP, CBC, vitamin D, and vitamin B12) on 7/27/2023. Medications include chlorthalidone 25 mg daily, montelukast 10 mg PRN for seasonal allergies, fluticasone-salmeterol 100-50 mcg/dose inhaler (1 puff BID) PRN for asthma, and albuterol 90 mcg/act inhaler (2 puffs q6H) PRN for asthma. Denies history of head trauma, stroke, seizures, MI, or heart disease. Reviewed with patient her PMH, PSH, medications, allergies, social history, and family history which are as documented    On evaluation, patient is sitting in chair and in no acute distress. Sleep history as below. Symptoms (snoring, witnessed apnea, and EDS) began in ~2003 and are relatively stable. Since high school and college she could sleep through classes - no matter how much she slept the night before (particularly if "boring" - not engaged). Exhaustion was worse during pregnancy due to GERD and pain. CTS during second pregnancy. Denies worsened RLS symptoms during pregnancy. Daily exhaustion, tiredness, and fatigue impact her quality of life. Infrequently dreams. No prior sleep study or treatment for TORIBIO.  Denies chest pain, palpitations, SOB, cough, wheezing, or peripheral edema. Sleep Schedule and Sleep Hygiene:  Patient reports problems with sleep: Snoring, witnessed apnea, and EDS  Work history: Full time at [employer].  School at "[employer]  Work hours: [address], 8 AM to 4 PM  Shift work: denies  Living situation: Lives with  and two children (11year-old and 15year-old)    Bed Time: 10 PM-12 AM  Lights Out: 10 PM-12 AM  Sleep Onset Latency: <15 minutes, difficulty ~1-2 times per month can be "hours" if a lot is on her mind from work   Frequency of Night-time Awakenings: ~1-2 times per night (due to children and unknown reasons), quickly able to fall back asleep   Wake Time: 6 AM  Rise Time: 6 AM  Days off schedule: Bed time 10 PM-12 AM, sleep latency <15 minutes, and wake/rise time 8-9 AM  Naps: yes, ~6-10 time per month for ~15-60 minutes in the PM (unrefreshed afterwards)  Total Sleep Time: ~7-8 hours  Prefers to sleep on prone    Sleep medications: denies  Stimulant medications: denies   Caffeine use: yes, tea (4-8 oz per day) and coffee (8 oz, 1-2 times per week - unsure if it helps her EDS)  Alcohol use: yes, infrequent (~1-2 time per month, 1 glass of wine)  Cigarettes: denies  Illicit drug use: denies    Sleep Disordered Breathing:  Snoring: yes, loudly   Witnessed apneas: yes  Shortness of breath or choking/gasping for air: denies  Morning dry mouth: denies  Morning headaches: denies  Non-refreshing sleep: yes  Nasal congestion: denies  Rhinorrhea: yes  Nocturia: denies    Other sleep related behaviors and symptoms:   Restless leg symptoms: yes, very infrequent  Kicking legs during sleep: denies  Parasomnias: denies  Nightmares: denies  Acid reflux during sleep: denies  Teeth grinding: denies  Teeth clenching: yes  Sleep paralysis, cataplexy, sleep attacks or hypnagogic or hypnopompic hallucinations: denies  REM Behavioral Sleep Disorder: denies    Daytime Symptoms:   Excessive daytime sleepiness: yes, morning and after lunch.  No difference between weekdays and weekends  Driving while drowsy: denies  History of motor vehicle accidents or near misses: denies  Cognitive problems: yes, issues with concentration and memory  Mood problems: denies  Problems at work, school or at home: yes    [place]:  Sitting and reading: High chance of dozing  Watching TV: Slight chance of dozing  Sitting, inactive in a public place (e.g. a theatre or a meeting): Slight chance of dozing  As a passenger in a car for an hour without a break: High chance of dozing  Lying down to rest in the afternoon when circumstances permit: High chance of dozing  Sitting and talking to someone: Slight chance of dozing  Sitting quietly after a lunch without alcohol: Slight chance of dozing  In a car, while stopped for a few minutes in traffic: Slight chance of dozing  Total score: 14    STOP-BANG Score for Obstructive Apnea of 5  Subjective  Do you snore loudly? - yes  Do you often feel tired, fatigued, or sleepy during the daytime? - yes  Has anyone observed you stop breathing during sleep? - yes  Do you have (or are you being treated for) high blood pressure? - yes  Objective  BMI - >35 kg/m2 - yes  Age - >50 years - no   Neck circumference - >40 cm - no   Gender - Male - no     History of tonsillectomy: denies    Social History:  Lives with  and two children (11year-old and 15year-old)  Works 8 AM to 4 PM  Normal diet  Caffeine use: yes, tea (4-8 oz per day) and coffee (8 oz, 1-2 times per week)  Alcohol use: yes, infrequent (~1-2 time per month, 1 glass of wine)  Cigarettes: denies  Illicit drug use: denies    Family History:  Father - snoring  Denies family history of TORIBIO, insomnia, or RLS    Historical Information   Past Medical History:   Diagnosis Date   • Animal dander allergy    • Asthma     with exacerbation last assessed 2015   • Hypertension 2022   • Pregnancy     :2010 SAB-natural;  F IOL for oligo-meconium   • Seasonal allergies    • Spontaneous      last assessed oct 3 2016   • Varicella     HX DISEASE   • Visual impairment     eyewear      Past Surgical History:   Procedure Laterality Date   • MOLE REMOVAL      on stomach     Family History   Problem Relation Age of Onset   • No Known Problems Mother    • Asthma Maternal Grandfather    • Parkinsonism Other    • Hypothyroidism Other    • Thyroid disease Other      Social History     Socioeconomic History   • Marital status: /Civil Union     Spouse name: Not on file   • Number of children: Not on file   • Years of education: Not on file   • Highest education level: Not on file   Occupational History   • Not on file   Tobacco Use   • Smoking status: Never   • Smokeless tobacco: Never   Vaping Use   • Vaping Use: Never used   Substance and Sexual Activity   • Alcohol use: Yes     Alcohol/week: 1.0 standard drink of alcohol     Types: 1 Glasses of wine per week     Comment: Don't drink very often   • Drug use: No   • Sexual activity: Yes     Partners: Male     Birth control/protection: Condom Male   Other Topics Concern   • Not on file   Social History Narrative    Domestic violence- denied     Social Determinants of Health     Financial Resource Strain: Not on file   Food Insecurity: Not on file   Transportation Needs: Not on file   Physical Activity: Not on file   Stress: Not on file   Social Connections: Not on file   Intimate Partner Violence: Not on file   Housing Stability: Not on file       Meds/Allergies   Allergies   Allergen Reactions   • Cat Hair Extract Allergic Rhinitis     Asthma flares up   • Avocado - Food Allergy GI Intolerance   • Banana - Food Allergy GI Intolerance   • Other Allergic Rhinitis     Seasonal allergies        Home medications:  Prior to Admission medications    Medication Sig Start Date End Date Taking?  Authorizing Provider   albuterol (PROVENTIL HFA,VENTOLIN HFA) 90 mcg/act inhaler Inhale 2 puffs every 6 (six) hours as needed for wheezing or shortness of breath 7/27/23  Yes Cristian Crocker MD   chlorthalidone 25 mg tablet Take 0.5 tablets (12.5 mg total) by mouth daily 7/27/23 1/23/24 Yes Cristian Crocker MD   Fluticasone-Salmeterol (Advair Diskus) 100-50 mcg/dose inhaler Inhale 1 puff 2 (two) times a day 7/27/23  Yes Cristian Crocker MD   montelukast (SINGULAIR) 10 mg tablet Take 1 tablet (10 mg total) by mouth daily at bedtime 7/27/23  Yes Cristian Crocker MD     Visit Vitals  /76 (BP Location: Left arm, Patient Position: Sitting, Cuff Size: Large)   Pulse 80   Ht 5' 3" (1.6 m)   Wt 101 kg (223 lb)   SpO2 98%   BMI 39.50 kg/m²   OB Status Unknown   Smoking Status Never   BSA 2.02 m²     Physical Exam:  General: Sitting in chair, awake alert and oriented to person, place, and time. No acute distress  HEENT: No craniofacial abnormalities. Mucous membranes, moist, no oral lesions, and normal dentition. Mallampati class - IV, mild overbite, and no retrognathia   NECK: Neck circumference - 15 inches. Trachea midline, no accessory muscle use, and no stridor   CARDIAC: Regular rate and rhythm  PULM: CTA bilaterally no wheezing, rhonchi or rales. No conversational dyspnea  EXT: No peripheral edema    NEURO: No focal neurologic deficits, moving all extremities appropriately    Labs: I have personally reviewed pertinent lab results.   Lab Results   Component Value Date    WBC 8.15 06/01/2018    HGB 12.7 06/01/2018    HCT 38.1 06/01/2018    MCV 86 06/01/2018     06/01/2018      Lab Results   Component Value Date    GLUCOSE 168 02/03/2018    GLUCOSE 113 02/03/2018    CALCIUM 9.0 06/01/2018    K 4.3 09/16/2019    CO2 22 09/16/2019     09/16/2019    BUN 12 09/16/2019    CREATININE 0.82 09/16/2019     No results found for: "IRON", "TIBC", "FERRITIN"  No results found for: "Wendall Joseph"  No results found for: "FOLATE"    Sleep studies:  No prior sleep study                                     DO Tavo Alejo Sleep Fellow

## 2023-10-03 ENCOUNTER — TELEPHONE (OUTPATIENT)
Dept: PULMONOLOGY | Facility: CLINIC | Age: 38
End: 2023-10-03

## 2023-10-25 ENCOUNTER — HOSPITAL ENCOUNTER (OUTPATIENT)
Dept: SLEEP CENTER | Facility: CLINIC | Age: 38
Discharge: HOME | End: 2023-10-25
Payer: COMMERCIAL

## 2023-10-25 DIAGNOSIS — G47.19 EXCESSIVE DAYTIME SLEEPINESS: ICD-10-CM

## 2023-10-25 DIAGNOSIS — I10 HYPERTENSION, UNSPECIFIED TYPE: ICD-10-CM

## 2023-10-25 DIAGNOSIS — R06.83 SNORING: ICD-10-CM

## 2023-10-25 PROCEDURE — G0399 HOME SLEEP TEST/TYPE 3 PORTA: HCPCS

## 2023-10-26 NOTE — PROGRESS NOTES
Home Sleep Study Documentation    HOME STUDY DEVICE: Noxturnal no                                           Lizeth G3 yes      Pre-Sleep Home Study:    Set-up and instructions performed by: [address] [place] [address]    Technician performed demonstration for Patient: yes    Return demonstration performed by Patient: yes    Written instructions provided to Patient: yes    Patient signed consent form: yes        Post-Sleep Home Study:    Additional comments by Patient: none    Home Sleep Study Failed:no:    Failure reason: N/A    Reported or Detected: N/A    Scored by:  DUNG Carnes

## 2023-10-27 DIAGNOSIS — G47.33 OSA (OBSTRUCTIVE SLEEP APNEA): Primary | ICD-10-CM

## 2023-10-27 DIAGNOSIS — J45.20 MILD INTERMITTENT ASTHMA WITHOUT COMPLICATION: ICD-10-CM

## 2023-10-27 PROBLEM — G47.19 EXCESSIVE DAYTIME SLEEPINESS: Status: ACTIVE | Noted: 2023-10-27

## 2023-10-27 PROCEDURE — 95806 SLEEP STUDY UNATT&RESP EFFT: CPT | Performed by: INTERNAL MEDICINE

## 2023-10-27 RX ORDER — ALBUTEROL SULFATE 90 UG/1
2 AEROSOL, METERED RESPIRATORY (INHALATION) EVERY 6 HOURS PRN
Qty: 18 G | Refills: 0 | Status: SHIPPED | OUTPATIENT
Start: 2023-10-27

## 2023-10-27 NOTE — PROGRESS NOTES
Ordered auto CPAP (5-15 cm H2O) via ResMed Airfit N30i mask based off of recent home sleep study results.      Cynthia Crouch  Sleep Medicine Fellow

## 2023-11-01 ENCOUNTER — TELEPHONE (OUTPATIENT)
Dept: SLEEP CENTER | Facility: CLINIC | Age: 38
End: 2023-11-01

## 2023-11-01 NOTE — TELEPHONE ENCOUNTER
Home sleep study shows severe TORIBIO   SHAWNA 36.0  APAP ordered. Spoke to the patient advised results she will contact her insurance for information on coverage for APAP.    She will call back when she knows DME provider

## 2023-11-14 ENCOUNTER — ANNUAL EXAM (OUTPATIENT)
Dept: OBGYN CLINIC | Facility: CLINIC | Age: 38
End: 2023-11-14

## 2023-11-14 VITALS
WEIGHT: 224 LBS | BODY MASS INDEX: 39.69 KG/M2 | SYSTOLIC BLOOD PRESSURE: 120 MMHG | DIASTOLIC BLOOD PRESSURE: 76 MMHG | HEIGHT: 63 IN

## 2023-11-14 DIAGNOSIS — Z23 NEED FOR HPV VACCINE: ICD-10-CM

## 2023-11-14 DIAGNOSIS — N39.3 STRESS INCONTINENCE: ICD-10-CM

## 2023-11-14 DIAGNOSIS — Z01.419 WOMEN'S ANNUAL ROUTINE GYNECOLOGICAL EXAMINATION: Primary | ICD-10-CM

## 2023-11-14 PROBLEM — R03.0 ELEVATED BLOOD PRESSURE READING: Status: ACTIVE | Noted: 2022-08-25

## 2023-11-14 NOTE — PROGRESS NOTES
Subjective    HPI:     Whitney Ravi is a 34 y.o. female. Happily  for 16 years. She is a [place] 4 Para 2, with  x 2. Her menstrual cycles are regular and predictable. Her current method of contraception includes condoms. She denies issues with intimacy. She has some mild stress incontinence when sneezing, coughing. She denies GI and Gyn complaints. She feels safe at home. She denies depression/anxiety. Medical, surgical and family history reviewed. Her dental care is up-to-date. She eats a healthy diet and exercises regularly. Visit Vitals  /76 (BP Location: Right arm, Patient Position: Sitting)   Ht 5' 3" (1.6 m)   Wt 102 kg (224 lb)   LMP 2023 (Approximate)   BMI 39.68 kg/m²   OB Status Having periods   Smoking Status Never   BSA 2.03 m²       Gynecologic History    Patient's last menstrual period was 2023 (approximate). Gardasil Vaccine Series: New age recommendation reviewed. Patient is interested in initiating. Last Pap: 16.  Results were: normal    Obstetric and Medical History    OB History    Para Term  AB Living   4 2 2   2 2   SAB IAB Ectopic Multiple Live Births   2     0 2      # Outcome Date GA Lbr Carter/2nd Weight Sex Delivery Anes PTL Lv   4 Term 04/15/18 41w0d / 00:57 3419 g (7 lb 8.6 oz) F Vag-Spont EPI, Spinal N TAIWO   3 SAB 16 8w0d          2 Term 11 39w0d  2977 g (6 lb 9 oz) F Vag-Spont  N TAIWO      Complications: Passage of meconium noted during delivery, Oligohydramnios   1 SAB 2010 6w0d             Obstetric Comments   Asthma, elderly multigravida       Past Medical History:   Diagnosis Date    Animal dander allergy     Asthma     with exacerbation last assessed 2015    Hypertension 2022    Pregnancy     : SAB-natural;  F IOL for oligo-meconium    Seasonal allergies     Spontaneous      last assessed oct 3 2016    Varicella     HX DISEASE    Visual impairment     eyewear        Past Surgical History:   Procedure Laterality Date    MOLE REMOVAL      on stomach       The following portions of the patient's history were reviewed and updated as appropriate: allergies, current medications, past family history, past medical history, past social history, past surgical history, and problem list.    Review of Systems    Pertinent items are noted in HPI. Objective    Physical Exam  Constitutional:       Appearance: Normal appearance. She is well-developed. Genitourinary:      Vulva, bladder and urethral meatus normal.      No lesions in the vagina. Right Labia: No rash, tenderness, lesions, skin changes or Bartholin's cyst.     Left Labia: No tenderness, lesions, skin changes, Bartholin's cyst or rash. No labial fusion noted. No inguinal adenopathy present in the right or left side. No vaginal discharge, erythema, tenderness, bleeding or granulation tissue. No vaginal prolapse present. No vaginal atrophy present. Right Adnexa: not tender, not full and no mass present. Left Adnexa: not tender, not full and no mass present. Cervix is parous. No cervical motion tenderness, discharge, friability, lesion, polyp or nabothian cyst.      Uterus is not enlarged, tender, irregular or prolapsed. No uterine mass detected. Uterus is anteverted. Pelvic exam was performed with patient in the lithotomy position. Breasts:     Breasts are symmetrical.      Right: No inverted nipple, mass, nipple discharge, skin change or tenderness. Left: No inverted nipple, mass, nipple discharge, skin change or tenderness. HENT:      Head: Normocephalic and atraumatic. Neck:      Thyroid: No thyromegaly. Cardiovascular:      Rate and Rhythm: Normal rate and regular rhythm. Heart sounds: Normal heart sounds, S1 normal and S2 normal.   Pulmonary:      Effort: Pulmonary effort is normal.      Breath sounds: Normal breath sounds.    Abdominal:      General: Bowel sounds are normal. There is no distension. Palpations: Abdomen is soft. There is no mass. Tenderness: There is no abdominal tenderness. There is no guarding. Hernia: There is no hernia in the left inguinal area or right inguinal area. Musculoskeletal:      Cervical back: Neck supple. Lymphadenopathy:      Cervical: No cervical adenopathy. Upper Body:      Right upper body: No supraclavicular or axillary adenopathy. Left upper body: No supraclavicular or axillary adenopathy. Lower Body: No right inguinal adenopathy. No left inguinal adenopathy. Neurological:      Mental Status: She is alert. Skin:     General: Skin is warm and dry. Findings: No rash. Psychiatric:         Attention and Perception: Attention and perception normal.         Mood and Affect: Mood and affect normal.         Speech: Speech normal.         Behavior: Behavior is cooperative. Thought Content: Thought content normal.         Cognition and Memory: Cognition and memory normal.         Judgment: Judgment normal.   Vitals and nursing note reviewed. Assessment and Plan    Elizabeth Alas was seen today for gynecologic exam.    Diagnoses and all orders for this visit:    Women's annual routine gynecological examination  -     IGP, Aptima HPV, Rfx 16/18,45    Need for HPV vaccine  -     HPV VACCINE 9 VALENT IM    Stress incontinence      Patient informed of a Stable GYN exam. A pap smear was performed. I have discussed the importance of exercise and healthy diet as well as adequate intake of calcium and vitamin D. The current ASCCP guidelines were reviewed. The low risk patient will receive pap smear screening every 3 years until the age of 34 and then every 3 to 5 years with HPV co-testing from the ages of 32-69. I emphasized the importance of an annual pelvic and breast exam. A yearly mammogram is scheduled for 1/10/24. Patient offered referral to pelvic floor PT. Declines at this time. Results will be released to [facility], if abnormal will call to review and discuss treatment plan. All questions have been answered to her satisfaction. Education reviewed: low fat, low cholesterol diet and weight bearing exercise. Contraception: condoms. Follow up in: 1 year for annual exam, 2 months for 2nd HPV vaccine.

## 2023-11-20 LAB
CYTOLOGIST CVX/VAG CYTO: ABNORMAL
DX ICD CODE: ABNORMAL
DX ICD CODE: ABNORMAL
HPV GENOTYPE REFLEX: ABNORMAL
HPV I/H RISK 4 DNA CVX QL PROBE+SIG AMP: NEGATIVE
OTHER STN SPEC: ABNORMAL
PATH REPORT.FINAL DX SPEC: ABNORMAL
PATHOLOGIST CVX/VAG CYTO: ABNORMAL
SL AMB NOTE:: ABNORMAL
SL AMB SPECIMEN ADEQUACY: ABNORMAL
SL AMB TEST METHODOLOGY: ABNORMAL

## 2024-01-02 ENCOUNTER — TELEPHONE (OUTPATIENT)
Dept: SLEEP CENTER | Facility: CLINIC | Age: 39
End: 2024-01-02

## 2024-01-02 NOTE — TELEPHONE ENCOUNTER
Returned the patient's call   She reports that she needs to work with [facility] [phone] and [facility] [phone]     Faxed RX and clinicals to [facility] at [phone]

## 2024-01-03 NOTE — TELEPHONE ENCOUNTER
Received phone call from Kimebrly Lozano. She stated that she received a fax from [facility] regarding the patient's CPAP machine but did not receive clinicals. She is requesting that all clinicals get faxed to [phone].

## 2024-01-10 ENCOUNTER — HOSPITAL ENCOUNTER (OUTPATIENT)
Dept: RADIOLOGY | Facility: HOSPITAL | Age: 39
Discharge: HOME | End: 2024-01-10
Attending: FAMILY MEDICINE
Payer: COMMERCIAL

## 2024-01-10 VITALS — WEIGHT: 220 LBS | HEIGHT: 63 IN | BODY MASS INDEX: 38.98 KG/M2

## 2024-01-10 DIAGNOSIS — Z12.31 ENCOUNTER FOR SCREENING MAMMOGRAM FOR BREAST CANCER: ICD-10-CM

## 2024-01-10 PROCEDURE — 77067 SCR MAMMO BI INCL CAD: CPT

## 2024-01-10 PROCEDURE — 77063 BREAST TOMOSYNTHESIS BI: CPT

## 2024-01-15 ENCOUNTER — CLINICAL SUPPORT (OUTPATIENT)
Dept: OBGYN CLINIC | Facility: CLINIC | Age: 39
End: 2024-01-15
Payer: COMMERCIAL

## 2024-01-15 DIAGNOSIS — Z23 NEED FOR HPV VACCINE: Primary | ICD-10-CM

## 2024-01-15 PROCEDURE — 90471 IMMUNIZATION ADMIN: CPT

## 2024-01-15 PROCEDURE — 90651 9VHPV VACCINE 2/3 DOSE IM: CPT

## 2024-01-29 ENCOUNTER — TELEPHONE (OUTPATIENT)
Dept: SLEEP CENTER | Facility: CLINIC | Age: 39
End: 2024-01-29

## 2024-01-29 DIAGNOSIS — J45.20 MILD INTERMITTENT ASTHMA WITHOUT COMPLICATION: ICD-10-CM

## 2024-01-29 NOTE — TELEPHONE ENCOUNTER
Patient left message stating she hasn't been able to use her CPAP recently due to a sinus infection.  Her insurance company told her to notify the doctor's office so this information can be documented in her chart so she isn't considered non-compliant.

## 2024-01-30 ENCOUNTER — TELEPHONE (OUTPATIENT)
Age: 39
End: 2024-01-30

## 2024-01-30 RX ORDER — ALBUTEROL SULFATE 90 UG/1
2 AEROSOL, METERED RESPIRATORY (INHALATION) EVERY 6 HOURS PRN
Qty: 18 G | Refills: 1 | Status: SHIPPED | OUTPATIENT
Start: 2024-01-30

## 2024-01-30 NOTE — TELEPHONE ENCOUNTER
Patient called, stating she has a sinus infection right now and is unable to use her CPAP and might have to be off of it for 1-2 weeks.  She stated her insurance company told her she must call her provider and have it documented so she is not considered non-compliant.  She did reach out to sleep medicine but has not heard back from them so she thought she better call PCP as well.

## 2024-01-31 ENCOUNTER — HOSPITAL ENCOUNTER (OUTPATIENT)
Dept: RADIOLOGY | Facility: HOSPITAL | Age: 39
Discharge: HOME | End: 2024-01-31
Attending: FAMILY MEDICINE
Payer: COMMERCIAL

## 2024-01-31 DIAGNOSIS — R92.8 ABNORMAL MAMMOGRAM: ICD-10-CM

## 2024-01-31 DIAGNOSIS — R92.8 ABNORMAL MAMMOGRAM: Primary | ICD-10-CM

## 2024-01-31 PROCEDURE — 76642 ULTRASOUND BREAST LIMITED: CPT

## 2024-02-07 ENCOUNTER — TELEPHONE (OUTPATIENT)
Age: 39
End: 2024-02-07

## 2024-02-07 NOTE — TELEPHONE ENCOUNTER
Patient called to confirm the note is in her chart regarding her not being able to use her Cpap due to a cold. Verified note with the patient it is in the chart. . No further actions needed .

## 2024-02-12 ENCOUNTER — TELEPHONE (OUTPATIENT)
Age: 39
End: 2024-02-12

## 2024-02-12 NOTE — TELEPHONE ENCOUNTER
The patient called she is traveling on 3/5/24 she was told she needs a typhoid -tetanus if needed and hep a and b vaccines  if needed  when can she get these please call her thank you

## 2024-02-12 NOTE — TELEPHONE ENCOUNTER
She may benefit from going to a travel clinic. They will go through and give whatever vaccines and/or medications she needs specific to the country she is going to. We don't do typhoid here as far as I know. I don't know any specific travel clinics here- she may have to research online.

## 2024-02-20 ENCOUNTER — TELEPHONE (OUTPATIENT)
Age: 39
End: 2024-02-20

## 2024-02-20 NOTE — TELEPHONE ENCOUNTER
Patient wanted to schedule an appt for hep A but we do not have any dates avail before they are leaving

## 2024-03-04 ENCOUNTER — OFFICE VISIT (OUTPATIENT)
Age: 39
End: 2024-03-04
Payer: COMMERCIAL

## 2024-03-04 VITALS
WEIGHT: 231 LBS | HEIGHT: 63 IN | OXYGEN SATURATION: 98 % | DIASTOLIC BLOOD PRESSURE: 90 MMHG | HEART RATE: 73 BPM | BODY MASS INDEX: 40.93 KG/M2 | SYSTOLIC BLOOD PRESSURE: 136 MMHG

## 2024-03-04 DIAGNOSIS — G47.33 OSA (OBSTRUCTIVE SLEEP APNEA): Primary | ICD-10-CM

## 2024-03-04 PROCEDURE — 99214 OFFICE O/P EST MOD 30 MIN: CPT | Performed by: INTERNAL MEDICINE

## 2024-03-04 NOTE — PATIENT INSTRUCTIONS
Nursing Support:  When: Monday through Friday 7A-5PM except holidays  Where: Our direct line is [phone].    If you are having a true emergency please call 911.  In the event that the line is busy or it is after hours please leave a voice message and we will return your call.  Please speak clearly, leaving your full name, birth date, best number to reach you and the reason for your call.   Medication refills: We will need the name of the medication, the dosage, the ordering provider, whether you get a 30 or 90 day refill, and the pharmacy name and address.  Medications will be ordered by the provider only.  Nurses cannot call in prescriptions.  Please allow 7 days for medication refills.  Physician requested updates: If your provider requested that you call with an update after starting medication, please be ready to provide us the medication and dosage, what time you take your medication, the time you attempt to fall asleep, time you fall asleep, when you wake up, and what time you get out of bed.  Sleep Study Results: We will contact you with sleep study results and/or next steps after the physician has reviewed your testing.       HOW TO CLEAN YOUR AUTO CPAP MACHINE    Mask: Clean the cushion of your mask daily. Dish soap and warm water.  (Usually eligible for mask cushions every 3 months)    Headgear: Once a week. I find when you wash it daily it eats the material of the Velcro faster.  (Usually eligible for new headgear every 6 months)    Heated Tubing: Clean the tube every 3-7 days. One drop of dish soap run warm water through the tube then hang it over your shower curtain and let it drip dry. (Usually eligible every 3 months)    Humidifier: Rinse out every 1-3 days. Dish soap warm water or , top shelf. (eligible every 6 months) **DISTILLED WATER ONLY**    Filters:  Dark blue (reusable for 6 months)- Rinse under warm water (no soap) sit and drip dry every 2-3 weeks.  (eligible for a new one every  6 months)    Light Blue (disposable) throw away every 2-4 weeks depending on how dirty it looks. (eligible for 6 every 3 months)    Check with your insurance and durable medical equipment company regarding supply replacements.     Strategies for improving sleep:    Keep a consistent bedtime and wake up time.  This will lead to a more regular sleep schedule and avoid periods of sleep deprivation or periods of extended wakefulness during the night.    Sleep in a colder environment.  Bedroom temperatures may need to be below 70 degrees Fahrenheit to help with decreasing body temperature.  The brain usually calms in colder temperatures.  Taking a hot shower/bath before bedtime may help with decreasing internal body temperature.      Avoid watching TV in bed.  If needing a form of media to help with sleep - can try sleep aid podcasts such Sleep With Me - a free podcast that helps with sleep initiation    Avoid napping, especially naps lasting longer than 1 hour or naps late in the day, which will likely affect your ability to fall asleep that night.    Limit caffeine/sugar, avoiding caffeine after lunch to allow it to get out of your system and not affect your ability to fall asleep or the quality of your sleep.  I usually recommend avoiding caffeine/sugar at least 6 hours before bedstime    Limit alcohol, alcohol can be sedating but also activating as it metabolizes, causing you to awaken from sleep earlier than desired.  It can affect the quality of your sleep by not letting you get into the more refreshing stages of sleep.    Avoid nicotine, of course not smoking or vaping at all is best, but nicotine is a stimulant and should be avoided near bedtime and during the night    Exercise and daytime physical activity is encouraged, in particular 4-6 hours before bedtime, as this may help you to fall asleep more easily and quality of sleep is improved.  Rigorous exercise within 3 hours of bedtime is discouraged.    Keep the  "sleep environment quiet and dark - Noise and light exposure during the night can disrupt sleep.  White noise or ear plugs are often recommended to reduce noise.  Using black out shades or an eye mask is commonly recommended to reduce light.  This also includes avoiding exposure to television or technology near bedtime, as this can have an impact on circadian rhythms by shifting sleep time later.    Bedroom clock - Avoid checking the time at night  This includes alarm clocks and other time pieces such as watches and phones.  Checking the time increases cognitive arousal and prolongs wakefulness.    Evening eating - Avoid eating close to bedtime which can cause acid reflux and unwanted weight gain, but don't go to bed hungry.  Eat a healthy and filling meal in the evening without over-eating and avoid late night snacks.    Be mindful of your medications - some medications if taken closer to bedtime can cause insomnia.  These medications include Effexor, Cymbalta, Wellbutrin, Metoprolol, Albuterol and others.  Of course, medications are important for other medical issues so a discussion needs to be had with your prescribing physician before changing medications on your own.     Avoid excessive time awake in bed:  Before going to bed, decreasing stressful thoughts is key to quieting the brain.  That's easier than done.  Some strategies to decrease these thoughts include a typical wind down routine (reading a book, hot shower/bath, calming TV show in the living room etc.), specified worry time (writing down your worries in a journal 2-3 hours before bed), progressive muscle relaxation ([employer] has some good tutorials on insomnia muscle relaxation), meditation.      With great difficulty falling asleep or with difficulty falling back asleep, laying in bed for a prolonged period time is not ideal.  This can increase worry and rumination (having racing thoughts, not able to \"turn off your brain\".  During the first part of " "the night - avoid going to bed unless you are drowsy.  Sometimes we go to bed because we feel like it's the \"right time\" but our brains aren't ready for sleeping.  This may paradoxically lead to more insomnia as you stay awake in bed waiting to go to sleep.  If you are able to fall asleep under 30 minutes of closing your eyes with the lights off, that is reasonable.  If not, then maybe you need to get out of bed.  After what feels like 30 minutes, if you feel wide awake, worried, anxious, or can't clear your brain, it is better to leave the bedroom to do something relaxing , The typical recommendation is to read a boring book in dim light.  In general, if you leave the room, you want to do something that is relaxing, not stimulating. Avoid bright screens, doing work, doing chores, or anything that requires a lot of mental effort. Return to bed when you feel more calm, sleepy, or mentally clear.      It is common in insomnia to look at the time at night to see what time it is, how long you have been awake, etc.  However, this can negatively affect sleep. I strongly recommend avoiding looking at the time at night.  Here are some ways to do this  1) Turn the clock around so you cannot see the time at night  2) Avoid wearing a watch to bed.    3) Leave your phone on the other side of the room so you cannot look at it at night  4) Set an alarm if you need to wake up in he morning. If you have not heard the alarm, assume it is still time to sleep.    If you practice this consistently, sleep quality and anxiety regarding sleep may improve.      There are some on-line resources that do require a fee that can be of help.  Some of which will require a fee.    Http://www.[employer].va.gov/apps/insomnia/index.html#dashboard (FREE)  Http://[employer]/cbt-online-insomnia-treatment.html  Http://www.[employer]/    Some apps that have helped people sleep:  Insomnia  (FREE)  Calm  CBT-I   Go! To Sleep by the " [facility]  Sleepio     It is very important to avoid driving while drowsy, this can be very dangerous or even cause serious injury or death.  If sleepy, it is not safe to get behind the wheel.  If you are driving and feels sleepy, it is very important to pull over right away.  Even losing control of the car for a split second can be deadly.  If you feel you cannot control when sleepiness occurs and cannot prevented, it is important to not drive at all until this improves.  Please let me know if you experience this as it is very important.

## 2024-03-04 NOTE — PROGRESS NOTES
Progress Note - Sleep Medicine  Gabriella Gonsalez 38 y.o. female MRN: [id number]    Impression & Plan:   Gabriella Gonsalez is a 38-year-old woman with PMH of severe TORIBIO on auto CPAP, EDS, obesity, HTN, mild intermittent asthma (well-controlled), seasonal allergies, and chronic LBP, who presents to the sleep medicine clinic follow-up of her severe TORIBIO on auto CPAP. Last seen on 9/25/2023. Home sleep study on 10/25/2023 revealed severe TORIBIO (SHAWNA 36.0). Weight of 223 lbs at time of her last sleep study. Denies any recent change in weight. Lost ~30 lbs since 2022 with diet and exercise. CPAP compliance as below, residual AHI of 0.8. Benefits of CPAP therapy include reduced snoring, non-refreshing sleep, and EDS. Unfortunately she had a URI and sinus infection in 1/2024 as well as seasonal allergies in 3/2024 which limited compliance. Notably her compliance may be limited from 3/4/2024-3/16/2024 due to international travel. Will continue to monitor her non-refreshing sleep and EDS particularly after a period of prolonged interrupted CPAP use (when she does not have an URI, sinus infection, or seasonal allergies). Continue current PAP settings and placed an order for PAP DME resupply today. Briefly discussed possibility of prescribing modafinil/armodafinil for residual EDS in the setting of appropriate management of TORIBIO with PAP therapy and patient expressed she would not be interested in pursuing management of her EDS with medication. Pending labs for chronic fatigue (TSH, free T4, CMP, CBC, vitamin D, and vitamin B12) ordered by PCP on 7/27/2023. Staffed and seen with Dr. Brumfield on 3/4/2024. Follow-up in 1 year.     Severe TORIBIO on auto CPAP  -Home sleep study on 10/25/2023 revealed severe TORIBIO (SHAWNA 36.0). Weight of 223 lbs at time of her last sleep study  -Denies any recent change in weight. Lost ~30 lbs since 2022 with diet and exercise  -CPAP compliance as below, residual AHI of 0.8  -Benefits of CPAP therapy include reduced snoring,  non-refreshing sleep, and EDS  -Unfortunately she had a URI and sinus infection in 1/2024 as well as seasonal allergies in 3/2024 which limited compliance  -Notably her compliance may be limited from 3/4/2024-3/16/2024 due to international travel  -Will continue to monitor her non-refreshing sleep and EDS particularly after a period of prolonged interrupted CPAP use (when she does not have an URI, sinus infection, or seasonal allergies)  -Continue current PAP settings and placed an order for PAP DME resupply today  -Recommend weight loss in conjunction with PCP    EDS  -[place] of 10, improved from 14 on 9/25/2023  -Will continue to monitor her non-refreshing sleep and EDS particularly after a period of prolonged interrupted CPAP use (when she does not have an URI, sinus infection, or seasonal allergies)  -Briefly discussed possibility of prescribing modafinil/armodafinil for residual EDS in the setting of appropriate management of TORIBIO with PAP therapy and patient expressed she would not be interested in pursuing management of her EDS with medication  -Pending labs for chronic fatigue (TSH, free T4, CMP, CBC, vitamin D, and vitamin B12) ordered by PCP on 7/27/2023 and iron profile ordered by this provider on 9/25/2024    Staffed and seen with Dr. Brumfield on 3/4/2024    Follow-up in 1 year  ______________________________________________________________________    HPI:    Gabriella Gonsalez is a 38-year-old woman with PMH of severe TORIBIO on auto CPAP, EDS, obesity, HTN, mild intermittent asthma (well-controlled), seasonal allergies, and chronic LBP, who presents to the sleep medicine clinic follow-up of her severe TORIBIO on auto CPAP. Last seen on 9/25/2023. Unaccompanied in the clinic today. Home sleep study on 10/25/2023 revealed severe TORIBIO (SHAWNA 36.0). Weight of 223 lbs at time of her last sleep study. Denies any recent change in weight. Lost ~30 lbs since 2022 with diet and exercise. Denies any significant change in PMH, PSH,  medications, or allergies since her last visit.      On evaluation, patient is sitting in chair and in no acute distress. Sleep schedule and symptoms as below. CPAP compliance as below, residual AHI of 0.8. Benefits of CPAP therapy include reduced snoring, non-refreshing sleep, and EDS. Unfortunately she had a URI and sinus infection in 1/2024 as well as seasonal allergies in 3/2024 which limited compliance. Current life stressors include completing her QUINTIN. She will be traveling to [place] from 3/4/2024-3/16/2024. Pending labs for chronic fatigue (TSH, free T4, CMP, CBC, vitamin D, and vitamin B12) ordered by PCP on 7/27/2023.    Patient reported issues with the following symptoms:   Mask type: Nasal pillows   Mask leaking: denies  Skin irritation from the mask:denies  Pain or discomfort: denies  Feeling of claustrophobia: denies  Aerophagia: denies  Pressure intolerance: denies  Nasal congestion or rhinorrhea: denies  Nasal and oral dryness: denies  Snoring with PAP: denies     Using PAP every night/day: yes  Using PAP the entire duration of sleep: yes, takes off at ~6 AM (due to nasal congestion due to URI (sinus infection in 1/2024) and seasonal allergies)  Benefiting from PAP: yes, decreased non-refreshing sleep and EDS    Sleep Schedule and Sleep Hygiene:  Bed Time: 10 PM-12 AM  Lights Out: 10 PM-12 AM  Sleep Onset Latency: ~15 minutes  Frequency of Night-time Awakenings: ~1-2 times per night (due to children and unknown reasons), quickly able to fall back asleep   Wake Time: 6 AM  Rise Time: 6 AM  Naps: yes, same as prior   Total Sleep Time: ~6-8 hours  Prefers to sleep on prone, sleeps with mouth closed      Sleep medications: denies  Stimulant medications: denies   Caffeine use: yes, tea (4-8 oz per day) and coffee (8 oz, 1-2 times per week - unsure if it helps her EDS)  Alcohol use: yes, infrequent (~1-2 time per month, 1 glass of wine)  Cigarettes: denies  Illicit drug use: denies     Sleep Disordered  Breathing:  Snoring: yes, loudly, improved with CPAP  Witnessed apneas: yes, improved with CPAP  Shortness of breath or choking/gasping for air: denies  Morning dry mouth: denies  Morning headaches: denies  Non-refreshing sleep: yes, improved with CPAP  Nasal congestion: yes, URI, sinus infection, and seasonal allergies   Nocturia: denies     Other sleep related behaviors and symptoms:   Restless leg symptoms: yes, very infrequent  Kicking legs during sleep: denies  Parasomnias: denies  Acid reflux during sleep: denies  Sleep paralysis, cataplexy, sleep attacks or hypnagogic or hypnopompic hallucinations: denies  REM Behavioral Sleep Disorder: denies    Daytime Symptoms:   Excessive daytime sleepiness: yes, morning and after lunch. No difference between weekdays and weekends  Driving while drowsy: denies  History of motor vehicle accidents or near misses: denies  Mood problems: denies  Problems at work, school or at home: yes     [place]: 10    [place] of 14 on 9/25/2023     Social History:  Lives with  and two children (5 year-old and 12 year-old)  Works 8 AM to 4 PM  Normal diet  Caffeine use: yes, tea (4-8 oz per day) and coffee (8 oz, 1-2 times per week)  Alcohol use: yes, infrequent (~1-2 time per month, 1 glass of wine)  Cigarettes: denies  Illicit drug use: denies    Social history updates:  Social History     Tobacco Use   Smoking Status Never   Smokeless Tobacco Never     Social History     Socioeconomic History    Marital status: /Civil Union     Spouse name: Not on file    Number of children: Not on file    Years of education: Not on file    Highest education level: Not on file   Occupational History    Not on file   Tobacco Use    Smoking status: Never    Smokeless tobacco: Never   Vaping Use    Vaping status: Never Used   Substance and Sexual Activity    Alcohol use: Yes     Alcohol/week: 1.0 standard drink of alcohol     Types: 1 Glasses of wine per week     Comment: Don't drink very  "often    Drug use: No    Sexual activity: Yes     Partners: Male     Birth control/protection: Condom Male   Other Topics Concern    Not on file   Social History Narrative    Domestic violence- denied     Social Determinants of Health     Financial Resource Strain: Not on file   Food Insecurity: Not on file   Transportation Needs: Not on file   Physical Activity: Not on file   Stress: Not on file (2/11/2021)   Social Connections: Not on file   Intimate Partner Violence: Low Risk  (4/13/2021)    Received from [facility]    Intimate Partner Violence     Insults You: Not on file     Threatens You: Not on file     Screams at You: Not on file     Physically Hurt: Not on file     Intimate Partner Violence Score: Not on file   Housing Stability: Not on file     PhysicalExamination:  Vitals:   /90 (BP Location: Left arm, Patient Position: Sitting, Cuff Size: Standard)   Pulse 73   Ht 5' 3\" (1.6 m)   Wt 105 kg (231 lb)   SpO2 98%   BMI 40.92 kg/m²     Physical Exam:  General: Sitting in chair, awake, and alert. No acute distress  HEENT: Nares patent, no craniofacial abnormalities. Moist mucous membranes  NECK: Trachea midline, no accessory muscle use, and no stridor   CARDIAC: Regular rate and rhythm  PULM: CTA bilaterally no wheezing, rhonchi or rales. No conversational dyspnea  EXT: No peripheral edema    NEURO: No focal neurologic deficits, moving all extremities appropriately    Diagnostic Data:  Labs:  I personally reviewed the most recent laboratory data pertinent to today's visit  Annual Exam on 11/14/2023   Component Date Value    Diagnosis: 11/14/2023 Comment (A)     Specimen Adequacy 11/14/2023 Comment     Clinician Provided ICD10 11/14/2023 Comment     Performed by: 11/14/2023 Comment     Electronically signed by: 11/14/2023 Comment     SL AMB . 11/14/2023 .     Pathologist provided ICD* 11/14/2023 Comment     Note: 11/14/2023 Comment     Test Methodology: 11/14/2023 Comment     HPV Aptima " "11/14/2023 Negative     HPV GENOTYPE REFLEX 11/14/2023 Comment      I have personally reviewed pertinent lab results.  Lab Results   Component Value Date    WBC 8.15 06/01/2018    HGB 12.7 06/01/2018    HCT 38.1 06/01/2018    MCV 86 06/01/2018     06/01/2018     Lab Results   Component Value Date    GLUCOSE 168 02/03/2018    GLUCOSE 113 02/03/2018    CALCIUM 9.0 06/01/2018    K 4.3 09/16/2019    CO2 22 09/16/2019     09/16/2019    BUN 12 09/16/2019    CREATININE 0.82 09/16/2019     No results found for: \"IGE\"  Lab Results   Component Value Date    ALT 16 09/16/2019    AST 11 09/16/2019    ALKPHOS 50 06/01/2018     No results found for: \"IRON\", \"TIBC\", \"FERRITIN\"  No results found for: \"MUWGNAZU04\"  No results found for: \"FOLATE\"    Sleep studies:  -Home sleep study on 10/25/2023 revealed severe TORIBIO (SHAWNA 36.0). Weight of 223 lbs at time of her last sleep study    Compliance Data from 2/4/2024-3/4/2024:    Mask type: Nasal pillows  Type of CPAP: Stephy (auto 5-15 cm H2O)  Percent usage: 96.7%, >4 hours  Average time used: 6 hours, 42 minutes  Average high leak time: 0 minutes  Average leak: 2.9 L/minute  95th percentile pressure: 7.7 cm H2O  Mean pressure: 6.1 cm H2O  Residual AHI: 0.8 ([place] 0.1)    Naveen Hamlin DO  [facility] Sleep Medicine Fellow  "

## 2024-03-04 NOTE — ASSESSMENT & PLAN NOTE
SHAWNA 36, severe TORIBIO on 10/25/23 HST.  Started on auto CPAP.  Doing well with symptomatic benefit and great compliance    Compliance Data from 2/4/2024-3/4/2024:    Mask type: Nasal pillows  Type of CPAP: Stephy (auto 5-15 cm H2O)  Percent usage: 96.7%, >4 hours  Average time used: 6 hours, 42 minutes  Average high leak time: 0 minutes  Average leak: 2.9 L/minute  95th percentile pressure: 7.7 cm H2O  Mean pressure: 6.1 cm H2O  Residual AHI: 0.8 (SALV[place]OR 0.1)    Supply Rx provided  Patient going to [employer] soon and bringing her CPAP so compliance may be a bit spotty due to internet access  FU in 1 year

## 2024-03-05 ENCOUNTER — TELEPHONE (OUTPATIENT)
Dept: SLEEP CENTER | Facility: CLINIC | Age: 39
End: 2024-03-05

## 2024-03-06 LAB

## 2024-03-22 ENCOUNTER — CLINICAL SUPPORT (OUTPATIENT)
Dept: OBGYN CLINIC | Facility: CLINIC | Age: 39
End: 2024-03-22
Payer: COMMERCIAL

## 2024-03-22 VITALS
SYSTOLIC BLOOD PRESSURE: 112 MMHG | HEIGHT: 63 IN | DIASTOLIC BLOOD PRESSURE: 80 MMHG | BODY MASS INDEX: 39.87 KG/M2 | WEIGHT: 225 LBS

## 2024-03-22 DIAGNOSIS — Z23 NEED FOR HPV VACCINE: Primary | ICD-10-CM

## 2024-03-22 PROCEDURE — 90471 IMMUNIZATION ADMIN: CPT

## 2024-03-22 PROCEDURE — 90651 9VHPV VACCINE 2/3 DOSE IM: CPT

## 2024-03-22 NOTE — PROGRESS NOTES
Pt here for her last gardasil vaccine does # 3.  Injection was given in left deltoid without incident.    LOT # [id number]  EXP 1/23/2026  NDC# 1545-4775-94    Samara LUX CMA

## 2024-03-28 DIAGNOSIS — I10 HYPERTENSION, UNSPECIFIED TYPE: ICD-10-CM

## 2024-03-28 RX ORDER — CHLORTHALIDONE 25 MG/1
12.5 TABLET ORAL DAILY
Qty: 90 TABLET | Refills: 0 | Status: SHIPPED | OUTPATIENT
Start: 2024-03-28 | End: 2024-09-24

## 2024-06-30 DIAGNOSIS — J45.20 MILD INTERMITTENT ASTHMA WITHOUT COMPLICATION: ICD-10-CM

## 2024-06-30 RX ORDER — FLUTICASONE PROPIONATE AND SALMETEROL 100; 50 UG/1; UG/1
1 POWDER RESPIRATORY (INHALATION) 2 TIMES DAILY
Qty: 60 BLISTER | Refills: 2 | Status: SHIPPED | OUTPATIENT
Start: 2024-06-30

## 2024-11-17 DIAGNOSIS — I10 HYPERTENSION, UNSPECIFIED TYPE: ICD-10-CM

## 2024-11-20 DIAGNOSIS — I10 HYPERTENSION, UNSPECIFIED TYPE: ICD-10-CM

## 2024-11-20 DIAGNOSIS — J45.20 MILD INTERMITTENT ASTHMA WITHOUT COMPLICATION: ICD-10-CM

## 2024-11-21 RX ORDER — ALBUTEROL SULFATE 90 UG/1
2 INHALANT RESPIRATORY (INHALATION) EVERY 6 HOURS PRN
Qty: 18 G | Refills: 0 | Status: SHIPPED | OUTPATIENT
Start: 2024-11-21

## 2024-11-21 RX ORDER — CHLORTHALIDONE 25 MG/1
12.5 TABLET ORAL DAILY
Qty: 90 TABLET | Refills: 0 | Status: SHIPPED | OUTPATIENT
Start: 2024-11-21 | End: 2025-05-20

## 2024-11-22 RX ORDER — CHLORTHALIDONE 25 MG/1
12.5 TABLET ORAL DAILY
Qty: 90 TABLET | Refills: 0 | Status: SHIPPED | OUTPATIENT
Start: 2024-11-22

## 2025-01-03 ENCOUNTER — RA CDI HCC (OUTPATIENT)
Dept: OTHER | Facility: HOSPITAL | Age: 40
End: 2025-01-03

## 2025-01-03 DIAGNOSIS — E66.01 MORBID OBESITY (HCC): Primary | ICD-10-CM

## 2025-01-03 NOTE — PROGRESS NOTES
HCC coding opportunities          Chart Reviewed number of suggestions sent to Provider: 1     Patients Insurance        Commercial Insurance: [employer] Commercial Insurance     E66.01

## 2025-01-10 ENCOUNTER — OFFICE VISIT (OUTPATIENT)
Dept: FAMILY MEDICINE CLINIC | Facility: CLINIC | Age: 40
End: 2025-01-10
Payer: COMMERCIAL

## 2025-01-10 VITALS
DIASTOLIC BLOOD PRESSURE: 64 MMHG | RESPIRATION RATE: 16 BRPM | HEART RATE: 76 BPM | SYSTOLIC BLOOD PRESSURE: 128 MMHG | WEIGHT: 235.4 LBS | HEIGHT: 63 IN | BODY MASS INDEX: 41.71 KG/M2 | TEMPERATURE: 99.2 F

## 2025-01-10 DIAGNOSIS — I10 HYPERTENSION, UNSPECIFIED TYPE: ICD-10-CM

## 2025-01-10 DIAGNOSIS — Z23 NEED FOR COVID-19 VACCINE: ICD-10-CM

## 2025-01-10 DIAGNOSIS — Z13.83 SCREENING FOR CARDIOVASCULAR, RESPIRATORY, AND GENITOURINARY DISEASES: ICD-10-CM

## 2025-01-10 DIAGNOSIS — Z00.00 WELL ADULT EXAM: Primary | ICD-10-CM

## 2025-01-10 DIAGNOSIS — Z12.31 ENCOUNTER FOR SCREENING MAMMOGRAM FOR BREAST CANCER: ICD-10-CM

## 2025-01-10 DIAGNOSIS — Z13.6 SCREENING FOR CARDIOVASCULAR, RESPIRATORY, AND GENITOURINARY DISEASES: ICD-10-CM

## 2025-01-10 DIAGNOSIS — G47.33 OSA (OBSTRUCTIVE SLEEP APNEA): ICD-10-CM

## 2025-01-10 DIAGNOSIS — J45.20 MILD INTERMITTENT ASTHMA WITHOUT COMPLICATION: ICD-10-CM

## 2025-01-10 DIAGNOSIS — R92.8 ABNORMAL MAMMOGRAM: ICD-10-CM

## 2025-01-10 DIAGNOSIS — Z13.89 SCREENING FOR CARDIOVASCULAR, RESPIRATORY, AND GENITOURINARY DISEASES: ICD-10-CM

## 2025-01-10 DIAGNOSIS — Z13.29 SCREENING FOR THYROID DISORDER: ICD-10-CM

## 2025-01-10 DIAGNOSIS — Z13.0 SCREENING FOR DEFICIENCY ANEMIA: ICD-10-CM

## 2025-01-10 PROBLEM — G47.19 EXCESSIVE DAYTIME SLEEPINESS: Status: RESOLVED | Noted: 2023-10-27 | Resolved: 2025-01-10

## 2025-01-10 PROCEDURE — 99396 PREV VISIT EST AGE 40-64: CPT | Performed by: FAMILY MEDICINE

## 2025-01-10 PROCEDURE — 90480 ADMN SARSCOV2 VAC 1/ONLY CMP: CPT

## 2025-01-10 PROCEDURE — 91320 SARSCV2 VAC 30MCG TRS-SUC IM: CPT

## 2025-01-10 RX ORDER — CHLORTHALIDONE 25 MG/1
12.5 TABLET ORAL DAILY
Qty: 90 TABLET | Refills: 1 | Status: SHIPPED | OUTPATIENT
Start: 2025-01-10

## 2025-01-10 RX ORDER — ALBUTEROL SULFATE 90 UG/1
2 INHALANT RESPIRATORY (INHALATION) EVERY 6 HOURS PRN
Qty: 18 G | Refills: 0 | Status: SHIPPED | OUTPATIENT
Start: 2025-01-10

## 2025-01-10 NOTE — ASSESSMENT & PLAN NOTE
Stable.     Orders:    albuterol (PROVENTIL HFA,VENTOLIN HFA) 90 mcg/act inhaler; Inhale 2 puffs every 6 (six) hours as needed for wheezing or shortness of breath

## 2025-01-10 NOTE — ASSESSMENT & PLAN NOTE
Controlled on chlorthalidone.     Orders:    chlorthalidone 25 mg tablet; Take 0.5 tablets (12.5 mg total) by mouth daily

## 2025-01-10 NOTE — PROGRESS NOTES
Adult Annual Physical  Name: Gabriella Gonsalez      : 1985      MRN: [id number]  Encounter Provider: Michelle Morgan MD  Encounter Date: 1/10/2025   Encounter department: [facility]    Assessment & Plan  Well adult exam         Screening for deficiency anemia    Orders:    CBC and differential; Future    Screening for cardiovascular, respiratory, and genitourinary diseases    Orders:    Comprehensive metabolic panel; Future    Lipid Panel with Direct LDL reflex; Future    Screening for thyroid disorder    Orders:    TSH, 3rd generation with Free T4 reflex; Future    Encounter for screening mammogram for breast cancer    Orders:    Mammo screening right w 3d and cad; Future    Mild intermittent asthma without complication  Stable.     Orders:    albuterol (PROVENTIL HFA,VENTOLIN HFA) 90 mcg/act inhaler; Inhale 2 puffs every 6 (six) hours as needed for wheezing or shortness of breath    TORIBIO (obstructive sleep apnea)  Controlled on CPAP.          Hypertension, unspecified type  Controlled on chlorthalidone.     Orders:    chlorthalidone 25 mg tablet; Take 0.5 tablets (12.5 mg total) by mouth daily    Abnormal mammogram    Orders:    US breast bilateral limited (diagnostic); Future    Mammo diagnostic left w 3d and cad; Future    Need for COVID-19 vaccine    Orders:    COVID-19 Pfizer mRNA vaccine 12 yr and older (Comirnaty pre-filled syringe)    Hypertension, unspecified type  Controlled on chlorthalidone.     Orders:    chlorthalidone 25 mg tablet; Take 0.5 tablets (12.5 mg total) by mouth daily    Mild intermittent asthma without complication  Stable.     Orders:    albuterol (PROVENTIL HFA,VENTOLIN HFA) 90 mcg/act inhaler; Inhale 2 puffs every 6 (six) hours as needed for wheezing or shortness of breath    Immunizations and preventive care screenings were discussed with patient today. Appropriate education was printed on patient's after visit summary.           History of Present Illness     Adult  "Annual Physical:  Patient presents for annual physical.     Diet and Physical Activity:  - Diet/Nutrition: well balanced diet.  - Exercise: 1-2 times a week on average.    Depression Screening:  - PHQ-2 Score: 0    General Health:  - Sleep: sleeps well.  - Hearing: normal hearing bilateral ears.  - Vision: no vision problems, wears glasses and goes for regular eye exams.  - Dental: regular dental visits.    /GYN Health:  - Follows with GYN: yes.     Review of Systems   Constitutional: Negative.    HENT: Negative.     Eyes: Negative.    Respiratory: Negative.     Cardiovascular: Negative.    Gastrointestinal: Negative.    Endocrine: Negative.    Genitourinary: Negative.    Musculoskeletal: Negative.    Skin: Negative.    Allergic/Immunologic: Negative.    Neurological: Negative.    Hematological: Negative.    Psychiatric/Behavioral: Negative.           Objective   /64   Pulse 76   Temp 99.2 °F (37.3 °C)   Resp 16   Ht 5' 3\" (1.6 m)   Wt 107 kg (235 lb 6.4 oz)   LMP 12/31/2024 (Exact Date)   BMI 41.70 kg/m²     Physical Exam  Vitals and nursing note reviewed.   Constitutional:       General: She is not in acute distress.     Appearance: She is well-developed.   HENT:      Head: Normocephalic and atraumatic.      Right Ear: Tympanic membrane, ear canal and external ear normal. There is no impacted cerumen.      Left Ear: Tympanic membrane, ear canal and external ear normal. There is no impacted cerumen.      Nose: Nose normal.      Mouth/Throat:      Mouth: Mucous membranes are moist.   Eyes:      Conjunctiva/sclera: Conjunctivae normal.   Cardiovascular:      Rate and Rhythm: Normal rate and regular rhythm.      Heart sounds: No murmur heard.  Pulmonary:      Effort: Pulmonary effort is normal. No respiratory distress.      Breath sounds: Normal breath sounds.   Abdominal:      General: Abdomen is flat. There is no distension.      Palpations: Abdomen is soft. There is no mass.      Tenderness: There is " no abdominal tenderness. There is no guarding or rebound.      Hernia: No hernia is present.   Musculoskeletal:         General: Normal range of motion.      Cervical back: Neck supple.   Skin:     General: Skin is warm and dry.   Neurological:      General: No focal deficit present.      Mental Status: She is alert and oriented to person, place, and time.

## 2025-05-05 NOTE — PROGRESS NOTES
Assessment & Plan  Women's annual routine gynecological examination           ASSESSMENT & PLAN: Gabriella is a 40 y.o.  with normal gynecologic exam.    1.  Routine well woman exam done today.  2.  Cervical Cancer Screening: Pap was not done today.  Current ASCCP Guidelines reviewed. Can repeat at 3 years per ASCCP guidelines. Due   3. Mammogram is up to date on mammorgram. Diagnostic left mammogram/US ordered by PCP. Screening mammogram ordered by PCP.  Recommend yearly mammograms per ACOG guidelines.  4. Colon cancer screening to begin at age 45.   5. Gabriella declined STD testing in a mutually monogamous relationship   6.  Gardasil is recommended for patients from 9-45 years of age. Gabriella has t had the Gardasil vaccine series.   7. She declined contraception  8. I have discussed the importance of monthly self-breast exams, exercise a minimum of 150 minutes each week including weight bearing exercises while maintaining a healthy diet including adequate intake of Calcium and Vitamin D.   9. Return to office in 12 months for annual exam.   10. Call your insurance company to verify coverage prior to completing any ordered tests.     Subjective     HPI   Gabriella Gonsalez is a 40 y.o.  female who presents for annual well woman exam.     Menses are regular, monthly, lasting 4-5 days with moderate flow. denies intermenstrual bleeding, or spotting.   denies vaginal discharge, labial erythema or lesions.   denies vaginal itching, irritation and dryness.  Contraception: condoms.  Patient is sexually active with male partner/  of 18 years. Monogamous and feels safe in this relationship. Denies problems with intercourse.   She has no urinary concerns, does not have incontinence. denies breast concerns.   denies gynecologic surgeries.  Patient does not have a family history of breast, endometrial, colon, or ovarian ca. Cancer-related family history is not on file.    OB:  OB History    Para Term  AB  Living   4 2 2  2 2   SAB IAB Ectopic Multiple Live Births   2   0 2      # Outcome Date GA Lbr Carter/2nd Weight Sex Type Anes PTL Lv   4 Term 04/15/18 41w0d / 00:57 3419 g (7 lb 8.6 oz) F Vag-Spont EPI, Spinal N TAIWO   3 SAB 09/21/16 8w0d          2 Term 04/19/11 39w0d  2977 g (6 lb 9 oz) F Vag-Spont  N TAIWO      Complications: Passage of meconium noted during delivery, Oligohydramnios   1 SAB 05/2010 6w0d             Obstetric Comments   Asthma, elderly multigravida       Health Maintenance:    Active lifestyle   She does follow a well balanced diet.    She does not use tobacco and alcohol  She does follow with a PCP.    Screening:  Cervical cancer: last pap smear in 11/14/2023. Results were ASCUS, -HRHPV. Patient has  received Gardasil vaccine.   History of abnormal pap smears: Denies   Breast cancer: last mammogram in 01/10/2024. Results were BIRADS 3. Recommended diagnostic mammo to left breast and bilateral diagnostic US in 6 months, ordered   Colon cancer: not yet indicated  STD screening: declined.    Social History:  Social History     Socioeconomic History    Marital status: /Civil Union     Spouse name: Not on file    Number of children: Not on file    Years of education: Not on file    Highest education level: Not on file   Occupational History    Not on file   Tobacco Use    Smoking status: Never    Smokeless tobacco: Never   Vaping Use    Vaping status: Never Used   Substance and Sexual Activity    Alcohol use: Yes     Alcohol/week: 1.0 standard drink of alcohol     Types: 1 Glasses of wine per week     Comment: Don't drink very often    Drug use: No    Sexual activity: Not Currently     Partners: Male     Birth control/protection: Condom Male   Other Topics Concern    Not on file   Social History Narrative    Domestic violence- denied     Social Drivers of Health     Financial Resource Strain: Not on file   Food Insecurity: Not on file   Transportation Needs: Not on file   Physical Activity: Not on  file   Stress: Not on file (2024)   Social Connections: Not on file   Intimate Partner Violence: Low Risk  (2021)    [facility]    Intimate Partner Violence     Insults You: Not on file     Threatens You: Not on file     Screams at You: Not on file     Physically Hurt: Not on file     Intimate Partner Violence Score: Not on file   Housing Stability: Not on file       Social History     Tobacco Use    Smoking status: Never    Smokeless tobacco: Never   Vaping Use    Vaping status: Never Used   Substance Use Topics    Alcohol use: Yes     Alcohol/week: 1.0 standard drink of alcohol     Types: 1 Glasses of wine per week     Comment: Don't drink very often    Drug use: No       Review of Systems   Constitutional: Negative.  Negative for activity change, appetite change and fever.   Respiratory:  Negative for shortness of breath.    Cardiovascular:  Negative for chest pain.   Gastrointestinal:  Negative for abdominal pain, constipation, diarrhea and vomiting.   Genitourinary:  Negative for dyspareunia, dysuria, frequency, menstrual problem, pelvic pain, vaginal bleeding, vaginal discharge and vaginal pain.   Skin:  Negative for color change.   Psychiatric/Behavioral: Negative.     All other systems reviewed and are negative.      The following portions of the patient's history were reviewed and updated as appropriate: allergies, current medications, past family history, past medical history, past social history, past surgical history, and problem list.         OB History          4    Para   2    Term   2            AB   2    Living   2         SAB   2    IAB        Ectopic        Multiple   0    Live Births   2           Obstetric Comments   Asthma, elderly multigravida               Past Medical History:   Diagnosis Date    Animal dander allergy     Asthma     with exacerbation last assessed 2015    Hypertension     Miscarriage     Obesity     Pregnancy  "    : SAB-natural;  F IOL for oligo-meconium    Seasonal allergies     Spontaneous      last assessed oct 3 2016    Varicella     HX DISEASE    Visual impairment     eyewear        Past Surgical History:   Procedure Laterality Date    MOLE REMOVAL      on stomach       Family History   Problem Relation Age of Onset    No Known Problems Mother     Asthma Maternal Grandfather     Parkinsonism Other     Hypothyroidism Other     Thyroid disease Other          Current Outpatient Medications:     albuterol (PROVENTIL HFA,VENTOLIN HFA) 90 mcg/act inhaler, Inhale 2 puffs every 6 (six) hours as needed for wheezing or shortness of breath, Disp: 18 g, Rfl: 0    chlorthalidone 25 mg tablet, Take 0.5 tablets (12.5 mg total) by mouth daily, Disp: 90 tablet, Rfl: 1    Fluticasone-Salmeterol (Advair) 100-50 mcg/dose inhaler, INHALE ONE PUFF BY MOUTH TWICE A DAY, Disp: 60 blister, Rfl: 2    montelukast (SINGULAIR) 10 mg tablet, Take 1 tablet (10 mg total) by mouth daily at bedtime, Disp: 90 tablet, Rfl: 1    Allergies   Allergen Reactions    Cat Dander Allergic Rhinitis     Asthma flares up    Avocado - Food Allergy GI Intolerance    Banana - Food Allergy GI Intolerance    Other Allergic Rhinitis     Seasonal allergies        Objective   Vitals:    25 1059   BP: 120/80   BP Location: Right arm   Patient Position: Sitting   Weight: 108 kg (239 lb)   Height: 5' 3\" (1.6 m)     Physical Exam  Vitals and nursing note reviewed.   Constitutional:       General: She is not in acute distress.     Appearance: Normal appearance. She is not ill-appearing.   HENT:      Head: Normocephalic.   Neck:      Thyroid: No thyromegaly or thyroid tenderness.   Cardiovascular:      Rate and Rhythm: Normal rate and regular rhythm.      Heart sounds: Normal heart sounds.   Pulmonary:      Effort: Pulmonary effort is normal. No respiratory distress.      Breath sounds: Normal breath sounds.   Chest:   Breasts:     Right: Normal. " No inverted nipple, nipple discharge, skin change or tenderness.      Left: Normal. No inverted nipple, nipple discharge, skin change or tenderness.   Abdominal:      General: Abdomen is flat.      Palpations: Abdomen is soft.      Tenderness: There is no abdominal tenderness. There is no guarding.   Genitourinary:     General: Normal vulva.      Exam position: Lithotomy position.      Labia:         Right: No rash, tenderness or lesion.         Left: No rash, tenderness or lesion.       Urethra: No prolapse.      Vagina: Normal. No vaginal discharge, erythema, tenderness or lesions.      Cervix: Normal. No cervical motion tenderness, discharge or lesion.      Uterus: Not tender and no uterine prolapse.       Adnexa:         Right: No tenderness.          Left: No tenderness.     Musculoskeletal:      Cervical back: Neck supple.   Lymphadenopathy:      Cervical: No cervical adenopathy.   Skin:     General: Skin is warm and dry.      Capillary Refill: Capillary refill takes less than 2 seconds.   Neurological:      General: No focal deficit present.      Mental Status: She is alert and oriented to person, place, and time.   Psychiatric:         Mood and Affect: Mood normal.         Behavior: Behavior normal.         Thought Content: Thought content normal.         Judith OQUENDO  OB/GYN  5/6/2025  11:10 AM

## 2025-05-06 ENCOUNTER — ANNUAL EXAM (OUTPATIENT)
Dept: OBGYN CLINIC | Facility: CLINIC | Age: 40
End: 2025-05-06
Payer: COMMERCIAL

## 2025-05-06 VITALS
WEIGHT: 239 LBS | HEIGHT: 63 IN | BODY MASS INDEX: 42.35 KG/M2 | DIASTOLIC BLOOD PRESSURE: 80 MMHG | SYSTOLIC BLOOD PRESSURE: 120 MMHG

## 2025-05-06 DIAGNOSIS — Z01.419 WOMEN'S ANNUAL ROUTINE GYNECOLOGICAL EXAMINATION: Primary | ICD-10-CM

## 2025-05-06 PROCEDURE — 99396 PREV VISIT EST AGE 40-64: CPT

## 2025-06-03 ENCOUNTER — OFFICE VISIT (OUTPATIENT)
Dept: OBGYN CLINIC | Facility: CLINIC | Age: 40
End: 2025-06-03
Payer: COMMERCIAL

## 2025-06-03 ENCOUNTER — APPOINTMENT (OUTPATIENT)
Dept: RADIOLOGY | Facility: CLINIC | Age: 40
End: 2025-06-03
Attending: ORTHOPAEDIC SURGERY
Payer: COMMERCIAL

## 2025-06-03 VITALS — BODY MASS INDEX: 42.35 KG/M2 | WEIGHT: 239 LBS | HEIGHT: 63 IN

## 2025-06-03 DIAGNOSIS — Z01.89 ENCOUNTER FOR LOWER EXTREMITY COMPARISON IMAGING STUDY: ICD-10-CM

## 2025-06-03 DIAGNOSIS — M17.11 PRIMARY OSTEOARTHRITIS OF RIGHT KNEE: ICD-10-CM

## 2025-06-03 DIAGNOSIS — M25.551 RIGHT HIP PAIN: ICD-10-CM

## 2025-06-03 DIAGNOSIS — M25.561 ACUTE PAIN OF RIGHT KNEE: Primary | ICD-10-CM

## 2025-06-03 DIAGNOSIS — M54.16 LUMBAR RADICULOPATHY: ICD-10-CM

## 2025-06-03 DIAGNOSIS — M25.561 RIGHT KNEE PAIN, UNSPECIFIED CHRONICITY: ICD-10-CM

## 2025-06-03 PROCEDURE — 73562 X-RAY EXAM OF KNEE 3: CPT

## 2025-06-03 PROCEDURE — 99203 OFFICE O/P NEW LOW 30 MIN: CPT | Performed by: ORTHOPAEDIC SURGERY

## 2025-06-03 PROCEDURE — 72100 X-RAY EXAM L-S SPINE 2/3 VWS: CPT

## 2025-06-03 PROCEDURE — 73564 X-RAY EXAM KNEE 4 OR MORE: CPT

## 2025-06-03 PROCEDURE — 73502 X-RAY EXAM HIP UNI 2-3 VIEWS: CPT

## 2025-06-03 NOTE — LETTER
Alma 3, 2025     Patient: Gabriella Gonsalez  YOB: 1985  Date of Visit: 6/3/2025      To Whom it May Concern:    Gabriella Gonsalez is under my professional care. Gabriella was seen in my office on 6/3/2025.    Please accommodate patient to work from home for 2 weeks.    If you have any questions or concerns, please don't hesitate to call.          Sincerely,          Rashawn Wilbrun MD        CC: No Recipients

## 2025-06-03 NOTE — PROGRESS NOTES
Patient Name: Gabriella Gonsalez      : 1985       MRN: [id number]   Encounter Provider: Rashawn Wilburn MD   Encounter Date: 25  Encounter department: [facility]         Assessment & Plan  Acute pain of right knee    Orders:  •  XR knee 4+ vw right injury; Future  •  Ambulatory Referral to Physical Therapy; Future    Primary osteoarthritis of right knee    Orders:  •  Ambulatory Referral to Physical Therapy; Future    Lumbar radiculopathy    Orders:  •  Ambulatory Referral to Physical Therapy; Future       Plan:  Gabriella is a pleasant 40 y.o. female who presents for initial evaluation of right lower extremity pain, localized primarily at the knee. Upon review of the x-rays, a thorough history and my examination, Gabriella is presenting with signs and symptoms consistent with mild OA of the knee and lumbar radiculopathy. Etiology and treatment options discussed all questions addressed. Referral to physical therapy provided to address multifactorial pain. Recommend Voltaren topically to the knee, and OTC analgesics as needed. Because driving is most aggravating to her symptoms a work note was provided to work from home for 2 weeks.      Follow-up:  Return in about 6 weeks (around 7/15/2025).     _____________________________________________________  CHIEF COMPLAINT:  Chief Complaint   Patient presents with   • Right Hip - Pain   • Right Knee - Pain         SUBJECTIVE:  Gabriella Gonsalez is a 40 y.o. female who presents for initial evaluation of right leg pain. Patients pain starts at her knee but can radiate to her foot and hip/lumbar spine. Denies injury or trauma. Pain most aggravated while driving, she notes difficulty with plantar flexion due to pain while driving. Patient does state she changed vehicles from a sedan to an SUV, which has changed her position while driving. She has recently been required to return to the office 2-3x a week which is aggravating her symptoms. Denies  "numbness or paresthesias.       PAST MEDICAL HISTORY:  Past Medical History[1]    PAST SURGICAL HISTORY:  Past Surgical History[2]    FAMILY HISTORY:  Family History[3]    SOCIAL HISTORY:  Social History[4]    MEDICATIONS:  Current Medications[5]    ALLERGIES:  Allergies[6]    LABS:  HgA1c: No results found for: \"HGBA1C\"  BMP:   Lab Results   Component Value Date    GLUCOSE 168 02/03/2018    GLUCOSE 113 02/03/2018    CALCIUM 9.0 06/01/2018    K 4.3 09/16/2019    CO2 22 09/16/2019     09/16/2019    BUN 12 09/16/2019    CREATININE 0.82 09/16/2019     CBC: No components found for: \"CBC\"    _____________________________________________________  Review of Systems   Constitutional:  Negative for chills and fever.   HENT:  Negative for ear pain and sore throat.    Eyes:  Negative for pain and visual disturbance.   Respiratory:  Negative for cough and shortness of breath.    Cardiovascular:  Negative for chest pain and palpitations.   Gastrointestinal:  Negative for abdominal pain and vomiting.   Genitourinary:  Negative for dysuria and hematuria.   Musculoskeletal:  Negative for arthralgias and back pain.   Skin:  Negative for color change and rash.   Neurological:  Negative for seizures and syncope.   All other systems reviewed and are negative.       Right Ankle Exam     Tenderness   The patient is experiencing no tenderness.  Swelling: none    Muscle Strength   Dorsiflexion:  5/5  Plantar flexion:  5/5       Right Knee Exam     Tenderness   The patient is experiencing tenderness in the medial joint line.    Tests   Katie:  Medial - negative Lateral - negative  Varus: negative Valgus: negative    Other   Erythema: absent  Sensation: normal  Pulse: present  Swelling: none  Effusion: no effusion present    Comments:  Pain with extension   Skin is warm and dry to touch with no signs of erythema, ecchymosis, or infection  Flexor and extensor mechanisms are intact   Knee is stable to varus and valgus stress  Patella " tracks centrally palpable crepitus  Calf compartments are soft and supple  2+ DP and PT pulses with brisk capillary refill to the toes  Sural, saphenous, tibial, superficial, and deep peroneal motor and sensory distributions intact  Sensation light touch intact distally        Right Hip Exam     Tenderness   The patient is experiencing no tenderness.     Range of Motion   External rotation:  50   Internal rotation:  20     Muscle Strength   Flexion: 5/5     Tests   AMAN: negative    Other   Erythema: absent  Sensation: normal  Pulse: present    Comments:  (-) FADIR  (-) SLR                Physical Exam  Vitals and nursing note reviewed.   Constitutional:       Appearance: Normal appearance.   HENT:      Head: Normocephalic.      Right Ear: External ear normal.      Left Ear: External ear normal.     Eyes:      Extraocular Movements: Extraocular movements intact.      Conjunctiva/sclera: Conjunctivae normal.       Cardiovascular:      Rate and Rhythm: Normal rate.      Pulses: Normal pulses.   Pulmonary:      Effort: Pulmonary effort is normal.      Breath sounds: Normal breath sounds.   Abdominal:      General: Abdomen is flat.     Musculoskeletal:         General: Normal range of motion.      Cervical back: Normal range of motion and neck supple.      Right knee: No effusion.      Instability Tests: Medial Katie test negative and lateral Katie test negative.      Comments: See ortho exam     Skin:     General: Skin is warm and dry.     Neurological:      General: No focal deficit present.      Mental Status: She is alert.     Psychiatric:         Mood and Affect: Mood normal.         Behavior: Behavior normal.        _____________________________________________________  STUDIES REVIEWED:  I personally reviewed the pertinent images and my independent interpretation is as follows:  X-ray of the right knee obtained today demonstrates no acute osseous abnormalities.   X-ray of the right hip obtained today  demonstrates no acute osseous abnormalities  X-ray of the lumbar spine obtained today demonstrates no acute osseous abnormalities     PROCEDURES PERFORMED:  No procedures performed today.    Scribe Attestation    I,:  Nuvia Gupta am acting as a scribe while in the presence of the attending physician.:       I,:  Rashawn Wilburn MD personally performed the services described in this documentation    as scribed in my presence.:                    [1]  Past Medical History:  Diagnosis Date   • Animal dander allergy    • Asthma     with exacerbation last assessed 2015   • Hypertension    • Miscarriage    • Obesity    • Pregnancy     : SAB-natural;  F IOL for oligo-meconium   • Seasonal allergies    • Spontaneous      last assessed oct 3 2016   • Varicella     HX DISEASE   • Visual impairment     eyewear    [2]  Past Surgical History:  Procedure Laterality Date   • MOLE REMOVAL      on stomach   [3]  Family History  Problem Relation Name Age of Onset   • No Known Problems Mother     • Asthma Maternal Grandfather Grandpa    • Parkinsonism Other FOB Father    • Hypothyroidism Other FOB    • Thyroid disease Other FOB    [4]  Social History  Tobacco Use   • Smoking status: Never   • Smokeless tobacco: Never   Vaping Use   • Vaping status: Never Used   Substance Use Topics   • Alcohol use: Yes     Alcohol/week: 1.0 standard drink of alcohol     Types: 1 Glasses of wine per week     Comment: Don't drink very often   • Drug use: No   [5]    Current Outpatient Medications:   •  albuterol (PROVENTIL HFA,VENTOLIN HFA) 90 mcg/act inhaler, Inhale 2 puffs every 6 (six) hours as needed for wheezing or shortness of breath, Disp: 18 g, Rfl: 0  •  chlorthalidone 25 mg tablet, Take 0.5 tablets (12.5 mg total) by mouth daily, Disp: 90 tablet, Rfl: 1  •  Fluticasone-Salmeterol (Advair) 100-50 mcg/dose inhaler, INHALE ONE PUFF BY MOUTH TWICE A DAY, Disp: 60 blister, Rfl: 2  •  montelukast (SINGULAIR)  10 mg tablet, Take 1 tablet (10 mg total) by mouth daily at bedtime, Disp: 90 tablet, Rfl: 1[6]  Allergies  Allergen Reactions   • Cat Dander Allergic Rhinitis     Asthma flares up   • Avocado - Food Allergy GI Intolerance   • Banana - Food Allergy GI Intolerance   • Other Allergic Rhinitis     Seasonal allergies

## 2025-06-06 ENCOUNTER — EVALUATION (OUTPATIENT)
Dept: PHYSICAL THERAPY | Facility: CLINIC | Age: 40
End: 2025-06-06
Attending: ORTHOPAEDIC SURGERY
Payer: COMMERCIAL

## 2025-06-06 DIAGNOSIS — M25.561 ACUTE PAIN OF RIGHT KNEE: Primary | ICD-10-CM

## 2025-06-06 DIAGNOSIS — M54.16 LUMBAR RADICULOPATHY: ICD-10-CM

## 2025-06-06 DIAGNOSIS — M25.551 RIGHT HIP PAIN: ICD-10-CM

## 2025-06-06 PROCEDURE — 97162 PT EVAL MOD COMPLEX 30 MIN: CPT

## 2025-06-06 NOTE — PROGRESS NOTES
PT Evaluation     Today's date: 2025  Patient name: Gabriella Gonsalez  : 1985  MRN: [id number]  Referring provider: Rashawn Wilburn MD  Dx:   Encounter Diagnosis     ICD-10-CM    1. Acute pain of right knee  M25.561       2. Lumbar radiculopathy  M54.16       3. Right hip pain  M25.551           Start Time: 0818  Stop Time: 0845  Total time in clinic (min): 27 minutes    Assessment  Impairments: abnormal coordination, abnormal muscle firing, abnormal or restricted ROM, activity intolerance, impaired balance, impaired physical strength, lacks appropriate home exercise program, pain with function, weight-bearing intolerance and poor posture     Assessment details: Gabriella Gonsalez is a 40 y.o. female who presents to hospital based outpatient physical therapy with signs and symptoms consistent with the referring diagnosis of Acute pain of right knee , Lumbar radiculopathy, Right hip pain  . Patient presents with the following impairments: increased right leg pain, decreased strength, decreased ROM, postural dysfunction, limited core strength, limited LE strength, and limited lumbar A/PROM. Due to these impairments, patient has difficulty performing the following: ADL's, work-related activities, ambulation, stair negotiation, prolonged sitting, household chores, yard work, pain caused by sitting in the car . The patient has the following goals to be able to drive without pain following treatment with skilled physical therapy. Patient has been educated in home exercise program and plan of care.  Patient would benefit from skilled physical therapy services to address the above functional limitations, address the patients physical therapy goals, progress towards prior level of function and independence with home exercise program.      Goals  Short Term Goals [3 weeks; target date: 25]  1. Patient will be independent with initial HEP.  2. Patient will demonstrate an increase in lumbar AROM by 10% in all planes.    3. Patient will demonstrate an increase in B/L LE strength of 1/2 grade on MMT.    Long Term Goals [6 weeks; target date: 7/18/25]  1. Patient will be independent with comprehensive HEP.   2. Patient will demonstrate an increase in lumbar AROM to WNL in order to promote self-care activities pain-free.  3. Patient will demonstrate an increase in B/L LE strength to 4/5 on MMT.   4. Patient will be able to perform a full, functional squat with proper mechanics.   5. Patient will be able to drive for over 30 minutes with no increase in self reported right leg and back pain  6. Patient will be able to lift a 15 lb. object from floor height to waist height with proper mechanics.       Plan  Patient would benefit from: skilled physical therapy  Planned modality interventions: biofeedback, cryotherapy, TENS and thermotherapy: hydrocollator packs    Planned therapy interventions: IASTM, joint mobilization, manual therapy, kinesiology taping, massage, Evangelista taping, motor coordination training, muscle pump exercises, nerve gliding, neuromuscular re-education, therapeutic activities, stretching, strengthening, therapeutic exercise, home exercise program, functional ROM exercises, flexibility, abdominal trunk stabilization and activity modification    Frequency: 2x week  Duration in weeks: 8  Plan of Care beginning date: 6/6/2025  Plan of Care expiration date: 8/1/2025  Treatment plan discussed with: patient        Subjective Evaluation    History of Present Illness  Mechanism of injury: Patient reports she switched vehicles earlier this year. Patient reports she has increased her driving as well since this has began. As time when on she started having more pain in her knee when driving. Patient reports over the past few weeks the pain has radiated to the foot,calf, hip and low back. Patient reports symptoms are only on the right side. Patient reports she has tried using a massage gun and lumbar roll. Patient reports she  had relief from the massage gun and lumbar roll. Patient reports when she is not driving her pain decreases and as soon as she does drive it comes back. Patient reports when the pain starts after driving she has trouble with stairs and other activities due to the pain. Patient reports the pain impacts her ability to switch between the gas and brake.Patient reports she is off work from the [employer] for 2 weeks.   Patient Goals  Patient goals for therapy: increased strength, independence with ADLs/IADLs, return to sport/leisure activities, increased motion, decreased pain and improved balance    Pain  Current pain ratin  At best pain ratin  At worst pain ratin  Location: right anterior knee, anterior lower leg, calf , ankle, foot , right buttock and right side of low back  Quality: dull ache, sharp and radiating (extreme ache)  Relieving factors: change in position (walking)  Exacerbated by: driving! lying down.  Progression: worsening    Social Support    Employment status: working (off for 2 weeks)        Objective     Tenderness     Right Knee   Tenderness in the medial joint line.     Active Range of Motion   Left Hip   Flexion: WFL  Abduction: WFL  External rotation (90/90): WFL  Internal rotation (90/90): WFL    Right Hip   Flexion: WFL  Abduction: WFL  External rotation (90/90): WFL  Internal rotation (90/90): WFL  Left Knee   Flexion: 120 degrees   Extension: 0 degrees     Right Knee   Flexion: 120 degrees   Extension: 0 degrees     Tests     Right Knee   Negative anterior drawer, anterior Lachman, lateral Katie, medial Katie, posterior drawer, valgus stress test at 0 degrees, valgus stress test at 30 degrees, varus stress test at 0 degrees and varus stress test at 30 degrees.     Patient Goals:    Red Flag Screening  (-) for age >50  (-) for unexplained weight loss  (-) for history of cancer  (-) for saddle paresthesia  (-) for bladder/bowel dysfunction  (-) for night pain   (-) fever or chills      Neurological Testing  Light Touch Sensation - intact throughout BLE    Lumbar AROM     Flexion  floor   Extension 75%   L side glide wnl   R side glide wnl   L rotation wnl   R rotation  wnl     Repeated motions Position Repetitions performed Symptomatic response during Symptomatic response after   Flexion standing  10x No change No change    Extension standing 10x No change No change     Comments:         Testing  SLR: Right positive , Left negative          Lower Extremity Strength Testing    Hip MMT  Left  Right   Flexion  4+/5  4/5   Abduction 5/5 5/5   Adduction 5/5 5/5     Knee MMT  Left  Righ5   Flexion  4/5  4/5   Extension 4+/5 4+/5     Ankle MMT  Left  Right   Dorsiflexion 5/5  5/5    Plantarflexion 5/5 5/5      Knee ROM    LEFT  - flexion: 120   -EXT 0     Right  -Flexion 120  no pain with over pressure   -EXT 0     Medial joint line tenderness   Negative belle                  Insurance Eval/ Re-eval POC expires Auth Status Total visits  Start date  Expiration date Misc   Bcbs horizon 25                     Past Medical History[1]      Precautions: NA     EVAL 2 3 4 5 6   Visit Number     FOTO    Manuals         STM/MFR          Joint mobs                  Ther Ex         REIL         ROBYN Hep 2x10         Hip flexor stretch                                                                        Neuro Re-Ed         TA activation 10x10s        Hip add iso         Clamshells         Bridging         Supine march         Single leg ext         Quad sets  hep        SLR hep                                   Ther Activity         Pt education POC, HEP                                               [1]   Past Medical History:  Diagnosis Date    Animal dander allergy     Asthma     with exacerbation last assessed 2015    Hypertension     Miscarriage     Obesity     Pregnancy     :2010 [facility];  F IOL for oligo-meconium    Seasonal allergies     Spontaneous      last  assessed oct 3 2016    Varicella     HX DISEASE    Visual impairment     eyewear

## 2025-06-06 NOTE — HOME EXERCISE EDUCATION
Program_ID:[id number]   Access Code: [email or web address]  URL: [email or web address]  Date: 06-  Prepared By: Shankar TORRES&#39;Andry    Program Notes      Exercises      - Supine Quad Set - 2 x daily - 7 x weekly - 2 sets - 10 reps - 5s hold      - Supine Active Straight Leg Raise - 2 x daily - 7 x weekly - 3 sets - 10 reps      - Supine Transversus Abdominis Bracing - Hands on Stomach - 3 x daily - 7 x weekly - 2 sets - 10 reps - 5s hold      - Standing Lumbar Extension with Counter - 3-5 x daily - 7 x weekly - 2 sets - 10 reps

## 2025-06-10 ENCOUNTER — OFFICE VISIT (OUTPATIENT)
Dept: PHYSICAL THERAPY | Facility: CLINIC | Age: 40
End: 2025-06-10
Attending: ORTHOPAEDIC SURGERY
Payer: COMMERCIAL

## 2025-06-10 DIAGNOSIS — M54.16 LUMBAR RADICULOPATHY: ICD-10-CM

## 2025-06-10 DIAGNOSIS — M25.551 RIGHT HIP PAIN: ICD-10-CM

## 2025-06-10 DIAGNOSIS — M25.561 ACUTE PAIN OF RIGHT KNEE: Primary | ICD-10-CM

## 2025-06-10 PROCEDURE — 97110 THERAPEUTIC EXERCISES: CPT

## 2025-06-10 NOTE — PROGRESS NOTES
Daily Note     Today's date: 6/10/2025  Patient name: Gabriella Gonsalez  : 1985  MRN: [id number]  Referring provider: Rashawn Wilburn MD  Dx:   Encounter Diagnosis     ICD-10-CM    1. Acute pain of right knee  M25.561       2. Lumbar radiculopathy  M54.16       3. Right hip pain  M25.551           Start Time: 0800  Stop Time: 0845  Total time in clinic (min): 45 minutes    Subjective: Pt presents today stating she has minor symptoms and she feels improved since eval. She in unsure if she is just having a good day or if her symptoms have improved. Notes mild LBP but this may be secondary to her menstruation cycle.       Objective: See treatment diary below      Assessment: Tolerated treatment well. Pt mechanically assessed and educated on findings. Added REIL and SOC to HEP. Added standing variations to perform at work in case symptoms were provoked w/ ROBYN. She demo's good understanding of her HEP. Educated pt regarding prognosis, lumbar roll, and symptom management. Continue progressing pt as she can tolerate regarding force progression and symptom management. Patient demonstrated fatigue post treatment, exhibited good technique with therapeutic exercises, and would benefit from continued PT      Plan: Continue per plan of care.      Insurance Eval/ Re-eval POC expires Auth Status Total visits  Start date  Expiration date Misc   Bcbs horizon 25                     Past Medical History[1]      Precautions: NA     EVAL 2 3 4 5 6   Visit Number     FOTO    Manuals         STM/MFR          Joint mobs                  Ther Ex         REIL  1x10       ROBYN Hep 2x10  2x10 reviewed at wall and at table       Hip flexor stretch         Mechanical exam  30' w/ pt edu and review of HEP       SOC  1x10                                                    Neuro Re-Ed         TA activation 10x10s        Hip add iso         Clamshells         Bridging         Supine march         Single leg ext         Quad sets  hep         SLR hep                                   Ther Activity         Pt education POC, HEP                                                        [1]   Past Medical History:  Diagnosis Date    Animal dander allergy     Asthma     with exacerbation last assessed 2015    Hypertension     Miscarriage     Obesity     Pregnancy     :2010 [facility];  F IOL for oligo-meconium    Seasonal allergies     Spontaneous      last assessed oct 3 2016    Varicella     HX DISEASE    Visual impairment     eyewear

## 2025-06-13 ENCOUNTER — APPOINTMENT (OUTPATIENT)
Dept: PHYSICAL THERAPY | Facility: CLINIC | Age: 40
End: 2025-06-13
Attending: ORTHOPAEDIC SURGERY
Payer: COMMERCIAL

## 2025-06-17 ENCOUNTER — OFFICE VISIT (OUTPATIENT)
Dept: PHYSICAL THERAPY | Facility: CLINIC | Age: 40
End: 2025-06-17
Attending: ORTHOPAEDIC SURGERY
Payer: COMMERCIAL

## 2025-06-17 DIAGNOSIS — M54.16 LUMBAR RADICULOPATHY: ICD-10-CM

## 2025-06-17 DIAGNOSIS — M25.561 ACUTE PAIN OF RIGHT KNEE: Primary | ICD-10-CM

## 2025-06-17 DIAGNOSIS — M25.551 RIGHT HIP PAIN: ICD-10-CM

## 2025-06-17 PROCEDURE — 97110 THERAPEUTIC EXERCISES: CPT

## 2025-06-17 NOTE — PROGRESS NOTES
Daily Note     Today's date: 2025  Patient name: Gabriella Gonsalez  : 1985  MRN: [id number]  Referring provider: Rashawn Wilubrn MD  Dx:   Encounter Diagnosis     ICD-10-CM    1. Acute pain of right knee  M25.561       2. Lumbar radiculopathy  M54.16       3. Right hip pain  M25.551           Start Time: 0752  Stop Time: 0820  Total time in clinic (min): 28 minutes    Subjective: Patient reports no symptoms while driving or sitting. Patient reports repeated extension have been effective in reducing her pain. Patient also reports using lumbar roll while driving.       Objective: See treatment diary below      Assessment: Tolerated treatment well. Due to low symptom irritability session focused on core stability exercises and lower extremity strengthening. Patient HEP updated to include more LE strengthening movements . Discussed plan of care and canceling next session due to patient abolition of chief complaint with current driving level. Discussed patient maintaining current HEP when she increased both sitting and dirving to see if symptoms return. Patient demonstrated fatigue post treatment, exhibited good technique with therapeutic exercises, and would benefit from continued PT      Plan: Continue per plan of care.  Patient will call to add more sessions if chief complaint symptom returns. Case will be kept active for 30 days.        Insurance Eval/ Re-eval POC expires Auth Status Total visits  Start date  Expiration date Misc   Bcbs horizon 25                     Past Medical History[1]      Precautions: NA     EVAL 2  3 4 5 6   Visit Number 1 2 3 25  FOTO    Manuals         STM/MFR          Joint mobs                  Ther Ex         REIL  1x10       ROBYN Hep 2x10  2x10 reviewed at wall and at table 2x10    Prone press ups 2 x10       Hip flexor stretch         Mechanical exam  30' w/ pt edu and review of HEP       SOC  1x10                                                    Neuro Re-Ed          TA activation 10x10s  10x10s      Hip add iso         Clamshells   Bkfo 10x10s      Bridging   30x      Supine march   20x      Single leg ext         Quad sets  hep  Lateral step dwon 6 inch 2x10       SLR hep  3x10 2#    Hip abduction side raise 2x10      Mini squats                            Ther Activity         Pt education POC, HEP                                                         [1]   Past Medical History:  Diagnosis Date    Animal dander allergy     Asthma     with exacerbation last assessed 2015    Hypertension     Miscarriage     Obesity     Pregnancy     :2010 SAB-natural;  F IOL for oligo-meconium    Seasonal allergies     Spontaneous      last assessed oct 3 2016    Varicella     HX DISEASE    Visual impairment     eyewear

## 2025-06-17 NOTE — HOME EXERCISE EDUCATION
Program_ID:[id number]   Access Code: [email or web address]  URL: [email or web address]  Date: 06-  Prepared By: Shankar TORRES&#39;Andry    Program Notes      Exercises      - Standing Lumbar Extension with Counter - 3-5 x daily - 7 x weekly - 2 sets - 10 reps      - Prone Press Up - 3-5 x daily - 7 x weekly - 2 sets - 10 reps      - Supine Transversus Abdominis Bracing - Hands on Stomach - 3 x daily - 7 x weekly - 2 sets - 10 reps - 5s hold      - Supine Quad Set - 2 x daily - 7 x weekly - 2 sets - 10 reps - 5s hold      - Supine Active Straight Leg Raise - 1 x daily - 3 x weekly - 3 sets - 10 reps      - Supine Bridge - 1 x daily - 3 x weekly - 2 sets - 20 reps      - Lateral Step Down - 1 x daily - 3 x weekly - 2 sets - 10 reps

## 2025-06-19 ENCOUNTER — APPOINTMENT (OUTPATIENT)
Dept: PHYSICAL THERAPY | Facility: CLINIC | Age: 40
End: 2025-06-19
Attending: ORTHOPAEDIC SURGERY
Payer: COMMERCIAL

## 2025-08-13 ENCOUNTER — OFFICE VISIT (OUTPATIENT)
Dept: OBGYN CLINIC | Facility: CLINIC | Age: 40
End: 2025-08-13
Payer: COMMERCIAL

## 2025-08-15 LAB
ALBUMIN SERPL-MCNC: 4.1 G/DL (ref 3.9–4.9)
ALP SERPL-CCNC: 46 IU/L (ref 44–121)
ALT SERPL-CCNC: 14 IU/L (ref 0–32)
AST SERPL-CCNC: 22 IU/L (ref 0–40)
BILIRUB SERPL-MCNC: 0.4 MG/DL (ref 0–1.2)
BUN SERPL-MCNC: 12 MG/DL (ref 6–24)
BUN/CREAT SERPL: 13 (ref 9–23)
CALCIUM SERPL-MCNC: 9.5 MG/DL (ref 8.7–10.2)
CHLORIDE SERPL-SCNC: 99 MMOL/L (ref 96–106)
CHOLEST SERPL-MCNC: 181 MG/DL (ref 100–199)
CO2 SERPL-SCNC: 21 MMOL/L (ref 20–29)
CREAT SERPL-MCNC: 0.9 MG/DL (ref 0.57–1)
EGFR: 82 ML/MIN/1.73
GLOBULIN SER-MCNC: 3.5 G/DL (ref 1.5–4.5)
GLUCOSE SERPL-MCNC: 97 MG/DL (ref 70–99)
HDLC SERPL-MCNC: 29 MG/DL
LDLC SERPL CALC-MCNC: 130 MG/DL (ref 0–99)
LDLC/HDLC SERPL: 4.5 RATIO (ref 0–3.2)
MICRODELETION SYND BLD/T FISH: NORMAL
POTASSIUM SERPL-SCNC: 4.4 MMOL/L (ref 3.5–5.2)
PROT SERPL-MCNC: 7.6 G/DL (ref 6–8.5)
SL AMB VLDL CHOLESTEROL CALC: 22 MG/DL (ref 5–40)
SODIUM SERPL-SCNC: 136 MMOL/L (ref 134–144)
TRIGL SERPL-MCNC: 118 MG/DL (ref 0–149)
TSH SERPL DL<=0.005 MIU/L-ACNC: 2.27 UIU/ML (ref 0.45–4.5)